# Patient Record
Sex: FEMALE | Race: WHITE | Employment: UNEMPLOYED | URBAN - METROPOLITAN AREA
[De-identification: names, ages, dates, MRNs, and addresses within clinical notes are randomized per-mention and may not be internally consistent; named-entity substitution may affect disease eponyms.]

---

## 2018-01-05 PROCEDURE — 87624 HPV HI-RISK TYP POOLED RSLT: CPT | Performed by: OBSTETRICS & GYNECOLOGY

## 2018-01-05 PROCEDURE — 88175 CYTOPATH C/V AUTO FLUID REDO: CPT | Performed by: OBSTETRICS & GYNECOLOGY

## 2018-01-22 PROCEDURE — 80061 LIPID PANEL: CPT | Performed by: FAMILY MEDICINE

## 2018-04-17 PROCEDURE — 86160 COMPLEMENT ANTIGEN: CPT | Performed by: INTERNAL MEDICINE

## 2018-04-23 PROBLEM — M25.532 WRIST PAIN, LEFT: Status: ACTIVE | Noted: 2018-04-23

## 2018-04-23 PROBLEM — M25.531 WRIST PAIN, RIGHT: Status: ACTIVE | Noted: 2018-04-23

## 2018-06-21 PROBLEM — M25.532 WRIST PAIN, LEFT: Status: RESOLVED | Noted: 2018-04-23 | Resolved: 2018-06-21

## 2018-06-21 PROBLEM — M25.531 WRIST PAIN, RIGHT: Status: RESOLVED | Noted: 2018-04-23 | Resolved: 2018-06-21

## 2018-06-21 PROCEDURE — 87147 CULTURE TYPE IMMUNOLOGIC: CPT | Performed by: FAMILY MEDICINE

## 2018-06-21 PROCEDURE — 87086 URINE CULTURE/COLONY COUNT: CPT | Performed by: FAMILY MEDICINE

## 2018-06-25 PROBLEM — M25.531 RIGHT WRIST PAIN: Status: ACTIVE | Noted: 2018-04-23

## 2018-06-25 PROBLEM — M25.532 LEFT WRIST PAIN: Status: ACTIVE | Noted: 2018-04-23

## 2018-07-18 PROCEDURE — 81001 URINALYSIS AUTO W/SCOPE: CPT | Performed by: FAMILY MEDICINE

## 2018-07-18 PROCEDURE — 87186 SC STD MICRODIL/AGAR DIL: CPT | Performed by: FAMILY MEDICINE

## 2018-07-18 PROCEDURE — 87077 CULTURE AEROBIC IDENTIFY: CPT | Performed by: FAMILY MEDICINE

## 2018-07-18 PROCEDURE — 87086 URINE CULTURE/COLONY COUNT: CPT | Performed by: FAMILY MEDICINE

## 2018-08-03 PROCEDURE — 81001 URINALYSIS AUTO W/SCOPE: CPT | Performed by: FAMILY MEDICINE

## 2018-08-14 PROCEDURE — 81003 URINALYSIS AUTO W/O SCOPE: CPT | Performed by: UROLOGY

## 2018-08-30 PROCEDURE — 82507 ASSAY OF CITRATE: CPT | Performed by: UROLOGY

## 2018-08-30 PROCEDURE — 83945 ASSAY OF OXALATE: CPT | Performed by: UROLOGY

## 2018-08-30 PROCEDURE — 84392 ASSAY OF URINE SULFATE: CPT | Performed by: UROLOGY

## 2018-08-30 PROCEDURE — 82436 ASSAY OF URINE CHLORIDE: CPT | Performed by: UROLOGY

## 2018-08-30 PROCEDURE — 82340 ASSAY OF CALCIUM IN URINE: CPT | Performed by: UROLOGY

## 2019-01-29 ENCOUNTER — HOSPITAL ENCOUNTER (EMERGENCY)
Facility: HOSPITAL | Age: 44
Discharge: HOME | End: 2019-01-30
Attending: EMERGENCY MEDICINE
Payer: COMMERCIAL

## 2019-01-29 VITALS
RESPIRATION RATE: 14 BRPM | BODY MASS INDEX: 22 KG/M2 | HEART RATE: 72 BPM | OXYGEN SATURATION: 100 % | WEIGHT: 123.44 LBS | TEMPERATURE: 98 F | DIASTOLIC BLOOD PRESSURE: 76 MMHG | SYSTOLIC BLOOD PRESSURE: 113 MMHG

## 2019-01-29 DIAGNOSIS — R51.9 ACUTE NONINTRACTABLE HEADACHE, UNSPECIFIED HEADACHE TYPE: Primary | ICD-10-CM

## 2019-01-29 LAB
ANION GAP SERPL CALC-SCNC: 8 MMOL/L (ref 0–18)
BUN BLD-MCNC: 13 MG/DL (ref 8–20)
BUN/CREAT SERPL: 14 (ref 10–20)
CALCIUM BLD-MCNC: 9.3 MG/DL (ref 8.3–10.3)
CHLORIDE SERPL-SCNC: 107 MMOL/L (ref 101–111)
CO2 SERPL-SCNC: 26 MMOL/L (ref 22–32)
CREAT BLD-MCNC: 0.93 MG/DL (ref 0.55–1.02)
GLUCOSE BLD-MCNC: 93 MG/DL (ref 70–99)
OSMOLALITY SERPL CALC.SUM OF ELEC: 292 MOSM/KG (ref 275–295)
POTASSIUM SERPL-SCNC: 3.4 MMOL/L (ref 3.6–5.1)
SODIUM SERPL-SCNC: 141 MMOL/L (ref 136–144)

## 2019-01-29 PROCEDURE — 99284 EMERGENCY DEPT VISIT MOD MDM: CPT

## 2019-01-29 PROCEDURE — 96374 THER/PROPH/DIAG INJ IV PUSH: CPT

## 2019-01-29 PROCEDURE — 96375 TX/PRO/DX INJ NEW DRUG ADDON: CPT

## 2019-01-29 PROCEDURE — 80048 BASIC METABOLIC PNL TOTAL CA: CPT | Performed by: EMERGENCY MEDICINE

## 2019-01-29 RX ORDER — METOCLOPRAMIDE HYDROCHLORIDE 5 MG/ML
10 INJECTION INTRAMUSCULAR; INTRAVENOUS ONCE
Status: COMPLETED | OUTPATIENT
Start: 2019-01-29 | End: 2019-01-29

## 2019-01-29 RX ORDER — DIPHENHYDRAMINE HYDROCHLORIDE 50 MG/ML
25 INJECTION INTRAMUSCULAR; INTRAVENOUS ONCE
Status: COMPLETED | OUTPATIENT
Start: 2019-01-29 | End: 2019-01-29

## 2019-01-29 RX ORDER — KETOROLAC TROMETHAMINE 30 MG/ML
30 INJECTION, SOLUTION INTRAMUSCULAR; INTRAVENOUS ONCE
Status: COMPLETED | OUTPATIENT
Start: 2019-01-29 | End: 2019-01-29

## 2019-01-30 NOTE — ED PROVIDER NOTES
Patient Seen in: [facility] Emergency Department    History   Patient presents with:  Headache (neurologic)    Stated Complaint: migraine headache     HPI    53-year-old female with a history of depression, IBS, history of kidney stones presents to Smoker      Smokeless tobacco: Never Used    Alcohol use: Yes      Alcohol/week: 0.0 oz      Comment: seldom    Drug use: No      Review of Systems    Positive for stated complaint: migraine headache   Other systems are as noted in HPI.   Constitutional and established and labs were drawn. The patient was administered IV Toradol, Benadryl, Reglan medications for the purposes of treating  [acute headache]. The patient had good response to these medications.     Patient continued to be observed here in the e

## 2019-04-30 PROBLEM — G43.001 MIGRAINE WITHOUT AURA WITH STATUS MIGRAINOSUS: Status: ACTIVE | Noted: 2019-04-30

## 2019-04-30 NOTE — PROGRESS NOTES
Pt states she feels pressure through out the forehead, top of head and back of head. Pt states pain is fleeting. Pt states that she experiences dizziness, confusion, forgetfulness, and blurred vision with headaches.  Pt states that she also experiences weak

## 2019-04-30 NOTE — PATIENT INSTRUCTIONS
Refill policies:    • Allow 2-3 business days for refills; controlled substances may take longer.   • Contact your pharmacy at least 5 days prior to running out of medication and have them send an electronic request or submit request through the “request re been approved by your insurer. Depending on your insurance carrier, approval may take 3-10 days. It is highly recommended patients contact their insurance carrier directly to determine coverage.   If test is done without insurance authorization, patient ma PRN  Taper off Neurontin 100 mg qhs for a week, then stop, stop flexeril, stop inderal

## 2019-04-30 NOTE — PROGRESS NOTES
[facility] [address]   Neurology; INITIAL CLINIC VISIT  2019, 9:28 AM     Delfino Mancilla Patient Status:  No patient class for patient encounter    1975 MRN [id number]   Location [facility] retrograde pyelogram-Dr. Patsy Wright   • REMOVAL OF KIDNEY STONE  2002    kidney stone removed   • SIGMOIDOSCOPY,DIAGNOSTIC  4/9/12    wnl (to hepatic flexure)   • WISDOM TEETH REMOVED  2001    wisdom teeth removal x 4       FAMILY HISTORY:  family history include SYSTEMS:    GENERAL HEALTH:  feels well, calm, normal appetite,   EYES: no visual complaints or deficits  HEENT: denies nasal congestion, sinus pain or sore throat; no  hearing loss;  RESPIRATORY: denies shortness of breath, wheezing or cough   CARDIOVASCU No nystagmus, no ptosis       CN V: Masseters strong, Facial sensations normal,        CN  VII:  Symmetric facial movement       CN VIII:  Normal hearing       CN IX, XI:  Normal Gag, uvula palate midline       CN XII:  SCM strong, equal shoulder controlled with inderal, neurontin and flexiril, those made her forgetful and tired.    Will Use elavil 10 mg qhs, Imitrex and Fioricet PRN  Taper off Neurontin 100 mg qhs for a week, then stop,   stop flexeril,   stop inderal  Increase exercise,       Retu Anesthesia Volume In Cc: 6

## 2019-05-01 ENCOUNTER — TELEPHONE (OUTPATIENT)
Dept: NEUROLOGY | Facility: CLINIC | Age: 44
End: 2019-05-01

## 2019-06-23 ENCOUNTER — APPOINTMENT (OUTPATIENT)
Dept: GENERAL RADIOLOGY | Facility: HOSPITAL | Age: 44
End: 2019-06-23
Attending: EMERGENCY MEDICINE
Payer: COMMERCIAL

## 2019-06-23 ENCOUNTER — HOSPITAL ENCOUNTER (EMERGENCY)
Facility: HOSPITAL | Age: 44
Discharge: HOME | End: 2019-06-23
Attending: EMERGENCY MEDICINE
Payer: COMMERCIAL

## 2019-06-23 VITALS
OXYGEN SATURATION: 100 % | DIASTOLIC BLOOD PRESSURE: 72 MMHG | WEIGHT: 135 LBS | RESPIRATION RATE: 24 BRPM | BODY MASS INDEX: 23.92 KG/M2 | SYSTOLIC BLOOD PRESSURE: 105 MMHG | TEMPERATURE: 98 F | HEART RATE: 70 BPM | HEIGHT: 63 IN

## 2019-06-23 DIAGNOSIS — J20.9 ACUTE BRONCHITIS, UNSPECIFIED ORGANISM: Primary | ICD-10-CM

## 2019-06-23 PROCEDURE — 85730 THROMBOPLASTIN TIME PARTIAL: CPT | Performed by: EMERGENCY MEDICINE

## 2019-06-23 PROCEDURE — 80053 COMPREHEN METABOLIC PANEL: CPT | Performed by: EMERGENCY MEDICINE

## 2019-06-23 PROCEDURE — 93005 ELECTROCARDIOGRAM TRACING: CPT

## 2019-06-23 PROCEDURE — 83880 ASSAY OF NATRIURETIC PEPTIDE: CPT | Performed by: EMERGENCY MEDICINE

## 2019-06-23 PROCEDURE — 85025 COMPLETE CBC W/AUTO DIFF WBC: CPT | Performed by: EMERGENCY MEDICINE

## 2019-06-23 PROCEDURE — 85610 PROTHROMBIN TIME: CPT | Performed by: EMERGENCY MEDICINE

## 2019-06-23 PROCEDURE — 99284 EMERGENCY DEPT VISIT MOD MDM: CPT

## 2019-06-23 PROCEDURE — 71046 X-RAY EXAM CHEST 2 VIEWS: CPT | Performed by: EMERGENCY MEDICINE

## 2019-06-23 PROCEDURE — 99285 EMERGENCY DEPT VISIT HI MDM: CPT

## 2019-06-23 PROCEDURE — 84484 ASSAY OF TROPONIN QUANT: CPT | Performed by: EMERGENCY MEDICINE

## 2019-06-23 PROCEDURE — 96374 THER/PROPH/DIAG INJ IV PUSH: CPT

## 2019-06-23 PROCEDURE — 93010 ELECTROCARDIOGRAM REPORT: CPT

## 2019-06-23 PROCEDURE — 85378 FIBRIN DEGRADE SEMIQUANT: CPT | Performed by: EMERGENCY MEDICINE

## 2019-06-23 RX ORDER — KETOROLAC TROMETHAMINE 30 MG/ML
30 INJECTION, SOLUTION INTRAMUSCULAR; INTRAVENOUS ONCE
Status: COMPLETED | OUTPATIENT
Start: 2019-06-23 | End: 2019-06-23

## 2019-06-23 RX ORDER — LIDOCAINE HYDROCHLORIDE 20 MG/ML
10 SOLUTION OROPHARYNGEAL ONCE
Status: COMPLETED | OUTPATIENT
Start: 2019-06-23 | End: 2019-06-23

## 2019-06-23 RX ORDER — MAGNESIUM HYDROXIDE/ALUMINUM HYDROXICE/SIMETHICONE 120; 1200; 1200 MG/30ML; MG/30ML; MG/30ML
30 SUSPENSION ORAL ONCE
Status: COMPLETED | OUTPATIENT
Start: 2019-06-23 | End: 2019-06-23

## 2019-06-23 RX ORDER — BENZONATATE 100 MG/1
100 CAPSULE ORAL 3 TIMES DAILY PRN
Qty: 30 CAPSULE | Refills: 0 | Status: SHIPPED | OUTPATIENT
Start: 2019-06-23 | End: 2019-07-12 | Stop reason: ALTCHOICE

## 2019-06-23 RX ORDER — ALBUTEROL SULFATE 2.5 MG/3ML
SOLUTION RESPIRATORY (INHALATION) EVERY 6 HOURS PRN
COMMUNITY
End: 2019-07-12 | Stop reason: ALTCHOICE

## 2019-06-23 NOTE — ED INITIAL ASSESSMENT (HPI)
Pt presents to ER with cough, SOB, and chest pressure. Symptoms have been present for 3 weeks. Pt has seen her primary Md and been to urgent cares. Pt has taken antibiotics, steroids, inhaler, and breathing txs with no relief. Pt is speaking clearly.  Skin

## 2019-09-19 PROCEDURE — 87086 URINE CULTURE/COLONY COUNT: CPT | Performed by: FAMILY MEDICINE

## 2019-09-28 ENCOUNTER — APPOINTMENT (OUTPATIENT)
Dept: ULTRASOUND IMAGING | Facility: HOSPITAL | Age: 44
End: 2019-09-28
Attending: EMERGENCY MEDICINE
Payer: COMMERCIAL

## 2019-09-28 ENCOUNTER — HOSPITAL ENCOUNTER (EMERGENCY)
Facility: HOSPITAL | Age: 44
Discharge: HOME | End: 2019-09-28
Attending: EMERGENCY MEDICINE
Payer: COMMERCIAL

## 2019-09-28 VITALS
WEIGHT: 144 LBS | HEART RATE: 65 BPM | HEIGHT: 63 IN | SYSTOLIC BLOOD PRESSURE: 107 MMHG | RESPIRATION RATE: 12 BRPM | TEMPERATURE: 97 F | OXYGEN SATURATION: 100 % | DIASTOLIC BLOOD PRESSURE: 73 MMHG | BODY MASS INDEX: 25.52 KG/M2

## 2019-09-28 DIAGNOSIS — N83.202 CYST OF LEFT OVARY: Primary | ICD-10-CM

## 2019-09-28 PROCEDURE — 96361 HYDRATE IV INFUSION ADD-ON: CPT

## 2019-09-28 PROCEDURE — 85025 COMPLETE CBC W/AUTO DIFF WBC: CPT | Performed by: EMERGENCY MEDICINE

## 2019-09-28 PROCEDURE — 80053 COMPREHEN METABOLIC PANEL: CPT | Performed by: EMERGENCY MEDICINE

## 2019-09-28 PROCEDURE — 96374 THER/PROPH/DIAG INJ IV PUSH: CPT

## 2019-09-28 PROCEDURE — 99285 EMERGENCY DEPT VISIT HI MDM: CPT

## 2019-09-28 PROCEDURE — 81025 URINE PREGNANCY TEST: CPT

## 2019-09-28 PROCEDURE — 76770 US EXAM ABDO BACK WALL COMP: CPT | Performed by: EMERGENCY MEDICINE

## 2019-09-28 PROCEDURE — 76856 US EXAM PELVIC COMPLETE: CPT | Performed by: EMERGENCY MEDICINE

## 2019-09-28 PROCEDURE — 93975 VASCULAR STUDY: CPT | Performed by: EMERGENCY MEDICINE

## 2019-09-28 PROCEDURE — 76830 TRANSVAGINAL US NON-OB: CPT | Performed by: EMERGENCY MEDICINE

## 2019-09-28 PROCEDURE — 99284 EMERGENCY DEPT VISIT MOD MDM: CPT

## 2019-09-28 RX ORDER — KETOROLAC TROMETHAMINE 30 MG/ML
15 INJECTION, SOLUTION INTRAMUSCULAR; INTRAVENOUS ONCE
Status: COMPLETED | OUTPATIENT
Start: 2019-09-28 | End: 2019-09-28

## 2019-09-28 NOTE — ED INITIAL ASSESSMENT (HPI)
Left lower Quad pain and pelvis pain for the past three weeks and today pain got worse and some difficulty urinating.

## 2019-09-28 NOTE — ED PROVIDER NOTES
Patient Seen in: [facility] Emergency Department      History   Patient presents with:  Abdomen/Flank Pain (GI/)    Stated Complaint: LLQ abdominal pain    HPI    51-year-old female complaining of left lower quadrant abdominal pain.   The patient's seldom    Drug use: No             Review of Systems    Positive for stated complaint: LLQ abdominal pain  Other systems are as noted in HPI. Constitutional and vital signs reviewed. All other systems reviewed and negative except as noted above.     P LAVENDER   RAINBOW DRAW LIGHT GREEN   RAINBOW DRAW GOLD   CBC W/ DIFFERENTIAL          Ct Abdomen+pelvis(cpt=74176)    Result Date: 9/17/2019  IMPRESSION: 1. No radiographic evidence of obstructive uropathy or other acute abdominopelvic processes.  2. Right

## 2019-10-01 PROBLEM — N20.0 KIDNEY STONES: Chronic | Status: ACTIVE | Noted: 2019-10-01

## 2019-10-11 PROCEDURE — 81015 MICROSCOPIC EXAM OF URINE: CPT | Performed by: PHYSICIAN ASSISTANT

## 2019-10-28 ENCOUNTER — OFFICE VISIT (OUTPATIENT)
Dept: SURGERY | Facility: CLINIC | Age: 44
End: 2019-10-28
Payer: COMMERCIAL

## 2019-10-28 ENCOUNTER — TELEPHONE (OUTPATIENT)
Dept: SURGERY | Facility: CLINIC | Age: 44
End: 2019-10-28

## 2019-10-28 ENCOUNTER — HOSPITAL ENCOUNTER (OUTPATIENT)
Dept: GENERAL RADIOLOGY | Facility: HOSPITAL | Age: 44
Discharge: HOME | End: 2019-10-28
Attending: UROLOGY
Payer: COMMERCIAL

## 2019-10-28 VITALS — HEART RATE: 79 BPM | SYSTOLIC BLOOD PRESSURE: 92 MMHG | TEMPERATURE: 98 F | DIASTOLIC BLOOD PRESSURE: 66 MMHG

## 2019-10-28 DIAGNOSIS — R10.9 BILATERAL FLANK PAIN: Primary | ICD-10-CM

## 2019-10-28 DIAGNOSIS — R82.90 URINE FINDING: Primary | ICD-10-CM

## 2019-10-28 DIAGNOSIS — R10.9 BILATERAL FLANK PAIN: ICD-10-CM

## 2019-10-28 DIAGNOSIS — N20.0 KIDNEY STONES: ICD-10-CM

## 2019-10-28 PROCEDURE — 81003 URINALYSIS AUTO W/O SCOPE: CPT | Performed by: UROLOGY

## 2019-10-28 PROCEDURE — 99243 OFF/OP CNSLTJ NEW/EST LOW 30: CPT | Performed by: UROLOGY

## 2019-10-28 PROCEDURE — 74018 RADEX ABDOMEN 1 VIEW: CPT | Performed by: UROLOGY

## 2019-10-28 RX ORDER — CETIRIZINE HYDROCHLORIDE 10 MG/1
10 TABLET ORAL DAILY
COMMUNITY
End: 2021-07-22 | Stop reason: ALTCHOICE

## 2019-10-28 NOTE — TELEPHONE ENCOUNTER
Per Dr. Kenyatta Bertrand surgery has been changed to 10/30/19. Patient notified of change by Cameron Luna. Patient agreeable to change.

## 2019-10-28 NOTE — PROGRESS NOTES
Rooming Clinician:     Juanmustapha Desouza is a 41year old female. Patient presents with:  Flank Pain: bilateral flank pain and now pelvic pain x 7 weeks, 6/10 pain scale.  H/O kidney stone, gross hematuria  Kidney Stones / Ureteral Stone  Pain: Yes bilateral Take 10 mg by mouth daily. , Disp: , Rfl:   ergocalciferol 20125 units Oral Cap, Take 1 capsule (50,000 Units total) by mouth once a week., Disp: 16 capsule, Rfl: 0  Multiple Vitamins-Minerals (ZINC OR), Take by mouth., Disp: , Rfl:   Eletriptan Hydrobromid • Tennis elbow      Past Surgical History:   Procedure Laterality Date   • COLONOSCOPY  ~2005    Normal per pt   • ELBOW SURGERY Right    • ESWL LITHOTRIPSY WITH CYSTOSCOPY, STENT PLACEMENT Left 5/17/2010    Performed by Jhon Romero MD at [address] [place] [address] 8.3 09/28/2019    HGB 13.6 09/28/2019    HCT 40.8 09/28/2019    .0 09/28/2019    CREATSERUM 0.94 09/28/2019    BUN 13 09/28/2019     09/28/2019    K 3.4 (L) 09/28/2019     09/28/2019    CO2 27.0 09/28/2019    GLU 76 09/28/2019    CA 9.4

## 2019-10-28 NOTE — H&P
Patient is a history of left flank pain which is intermittent over approximately 6 weeks duration. Pain is moderately severe requiring pain medication. Patient is allergic to Tylenol.   Is also had some right flank pain last week with an episode of gr prior to the examination as well., Disp: 15 tablet, Rfl: 0  chlordiazepoxide-clidinium 5-2.5 MG Oral Cap, Take 1 capsule by mouth 4 (four) times daily before meals and nightly., Disp: 90 capsule, Rfl: 1  Cyclobenzaprine HCl 5 MG Oral Tab, Take 1 tablet (5 and denies heartburn  : see HPI  NEURO: no sensory or motor complaint     EXAM:      BP 92/66 (BP Location: Left arm, Patient Position: Sitting, Cuff Size: adult)   Pulse 79   Temp 97.6 °F (36.4 °C) (Oral)   GENERAL: well developed, well nourished,in no abdomen/pelvis, 9/17/2019  FINDINGS:  COLLECTING SYSTEM: The kidneys are symmetric in size without hydronephrosis. There are  nonobstructing right renal calculi measuring 4 x 2 mm (22/3) and 1 mm (30), respectively.  There are  tiny 1 mm nonobstructing left    KUB today     Schedule cystoscopy, bilateral retrograde pyelograms possible ureteroscopy and/or laser lithotripsy and possible stent insertion under general anesthesia as soon as possible        I discussed ureteroscopy with the patient and explained

## 2019-10-28 NOTE — PROGRESS NOTES
Your recent KUB is normal.  No stone is visible. Stool in the right and transverse colon. Recommend follow up in the office as directed.     Sincerely,  Domingo Foss MD

## 2019-10-28 NOTE — TELEPHONE ENCOUNTER
Cystoscopy, bilateral retrograde pyelograms possible ureteroscopy and/or laser lithotripsy and possible stent insertion scheduled at [facility] outpatient on 10/29/19. Per Pearl Flores, patient has been notified of date.   Pre op instructions wer

## 2019-10-29 ENCOUNTER — TELEPHONE (OUTPATIENT)
Dept: SURGERY | Facility: CLINIC | Age: 44
End: 2019-10-29

## 2019-10-29 NOTE — TELEPHONE ENCOUNTER
Per the automated system for [employer], there is no prior authorization needed for upcoming outpatient surgery. CPT codes 95953 and 73450. Reference #[id number].

## 2019-10-30 ENCOUNTER — HOSPITAL ENCOUNTER (OUTPATIENT)
Facility: HOSPITAL | Age: 44
Setting detail: HOSPITAL OUTPATIENT SURGERY
Discharge: HOME | End: 2019-10-30
Attending: UROLOGY | Admitting: UROLOGY
Payer: COMMERCIAL

## 2019-10-30 ENCOUNTER — APPOINTMENT (OUTPATIENT)
Dept: GENERAL RADIOLOGY | Facility: HOSPITAL | Age: 44
End: 2019-10-30
Attending: UROLOGY
Payer: COMMERCIAL

## 2019-10-30 ENCOUNTER — ANESTHESIA EVENT (OUTPATIENT)
Dept: SURGERY | Facility: HOSPITAL | Age: 44
End: 2019-10-30
Payer: COMMERCIAL

## 2019-10-30 ENCOUNTER — TELEPHONE (OUTPATIENT)
Dept: SURGERY | Facility: CLINIC | Age: 44
End: 2019-10-30

## 2019-10-30 ENCOUNTER — ANESTHESIA (OUTPATIENT)
Dept: SURGERY | Facility: HOSPITAL | Age: 44
End: 2019-10-30
Payer: COMMERCIAL

## 2019-10-30 VITALS
HEIGHT: 63 IN | DIASTOLIC BLOOD PRESSURE: 78 MMHG | TEMPERATURE: 98 F | OXYGEN SATURATION: 100 % | HEART RATE: 78 BPM | WEIGHT: 146.38 LBS | BODY MASS INDEX: 25.94 KG/M2 | RESPIRATION RATE: 18 BRPM | SYSTOLIC BLOOD PRESSURE: 109 MMHG

## 2019-10-30 DIAGNOSIS — R10.9 BILATERAL FLANK PAIN: ICD-10-CM

## 2019-10-30 PROCEDURE — 0T9880Z DRAINAGE OF BILATERAL URETERS WITH DRAINAGE DEVICE, VIA NATURAL OR ARTIFICIAL OPENING ENDOSCOPIC: ICD-10-PCS | Performed by: UROLOGY

## 2019-10-30 PROCEDURE — 0T778DZ DILATION OF LEFT URETER WITH INTRALUMINAL DEVICE, VIA NATURAL OR ARTIFICIAL OPENING ENDOSCOPIC: ICD-10-PCS | Performed by: UROLOGY

## 2019-10-30 PROCEDURE — 52332 CYSTOSCOPY AND TREATMENT: CPT | Performed by: UROLOGY

## 2019-10-30 PROCEDURE — 52351 CYSTOURETERO & OR PYELOSCOPE: CPT | Performed by: UROLOGY

## 2019-10-30 PROCEDURE — BT141ZZ FLUOROSCOPY OF KIDNEYS, URETERS AND BLADDER USING LOW OSMOLAR CONTRAST: ICD-10-PCS | Performed by: UROLOGY

## 2019-10-30 DEVICE — STENT URET 6F 24CM ULSMTH: Type: IMPLANTABLE DEVICE | Status: FUNCTIONAL

## 2019-10-30 RX ORDER — SODIUM CHLORIDE, SODIUM LACTATE, POTASSIUM CHLORIDE, CALCIUM CHLORIDE 600; 310; 30; 20 MG/100ML; MG/100ML; MG/100ML; MG/100ML
INJECTION, SOLUTION INTRAVENOUS CONTINUOUS
Status: DISCONTINUED | OUTPATIENT
Start: 2019-10-30 | End: 2019-10-30

## 2019-10-30 RX ORDER — NALOXONE HYDROCHLORIDE 0.4 MG/ML
80 INJECTION, SOLUTION INTRAMUSCULAR; INTRAVENOUS; SUBCUTANEOUS AS NEEDED
Status: DISCONTINUED | OUTPATIENT
Start: 2019-10-30 | End: 2019-10-30

## 2019-10-30 RX ORDER — DEXAMETHASONE SODIUM PHOSPHATE 4 MG/ML
VIAL (ML) INJECTION AS NEEDED
Status: DISCONTINUED | OUTPATIENT
Start: 2019-10-30 | End: 2019-10-30 | Stop reason: SURG

## 2019-10-30 RX ORDER — CLINDAMYCIN PHOSPHATE 900 MG/50ML
900 INJECTION INTRAVENOUS ONCE
Status: COMPLETED | OUTPATIENT
Start: 2019-10-30 | End: 2019-10-30

## 2019-10-30 RX ORDER — ONDANSETRON 2 MG/ML
INJECTION INTRAMUSCULAR; INTRAVENOUS
Status: DISCONTINUED
Start: 2019-10-30 | End: 2019-10-30

## 2019-10-30 RX ORDER — KETOROLAC TROMETHAMINE 30 MG/ML
INJECTION, SOLUTION INTRAMUSCULAR; INTRAVENOUS AS NEEDED
Status: DISCONTINUED | OUTPATIENT
Start: 2019-10-30 | End: 2019-10-30 | Stop reason: SURG

## 2019-10-30 RX ORDER — ONDANSETRON 2 MG/ML
4 INJECTION INTRAMUSCULAR; INTRAVENOUS EVERY 6 HOURS PRN
Status: DISCONTINUED | OUTPATIENT
Start: 2019-10-30 | End: 2019-10-30

## 2019-10-30 RX ORDER — ONDANSETRON 2 MG/ML
4 INJECTION INTRAMUSCULAR; INTRAVENOUS ONCE
Status: COMPLETED | OUTPATIENT
Start: 2019-10-30 | End: 2019-10-30

## 2019-10-30 RX ORDER — HYDROMORPHONE HYDROCHLORIDE 1 MG/ML
0.4 INJECTION, SOLUTION INTRAMUSCULAR; INTRAVENOUS; SUBCUTANEOUS EVERY 5 MIN PRN
Status: DISCONTINUED | OUTPATIENT
Start: 2019-10-30 | End: 2019-10-30

## 2019-10-30 RX ORDER — LIDOCAINE HYDROCHLORIDE 10 MG/ML
INJECTION, SOLUTION EPIDURAL; INFILTRATION; INTRACAUDAL; PERINEURAL AS NEEDED
Status: DISCONTINUED | OUTPATIENT
Start: 2019-10-30 | End: 2019-10-30 | Stop reason: SURG

## 2019-10-30 RX ORDER — ONDANSETRON 2 MG/ML
INJECTION INTRAMUSCULAR; INTRAVENOUS AS NEEDED
Status: DISCONTINUED | OUTPATIENT
Start: 2019-10-30 | End: 2019-10-30 | Stop reason: SURG

## 2019-10-30 RX ADMIN — ONDANSETRON 4 MG: 2 INJECTION INTRAMUSCULAR; INTRAVENOUS at 16:07:00

## 2019-10-30 RX ADMIN — KETOROLAC TROMETHAMINE 30 MG: 30 INJECTION, SOLUTION INTRAMUSCULAR; INTRAVENOUS at 16:07:00

## 2019-10-30 RX ADMIN — DEXAMETHASONE SODIUM PHOSPHATE 4 MG: 4 MG/ML VIAL (ML) INJECTION at 15:38:00

## 2019-10-30 RX ADMIN — CLINDAMYCIN PHOSPHATE 900 MG: 900 INJECTION INTRAVENOUS at 15:30:00

## 2019-10-30 RX ADMIN — LIDOCAINE HYDROCHLORIDE 50 MG: 10 INJECTION, SOLUTION EPIDURAL; INFILTRATION; INTRACAUDAL; PERINEURAL at 15:24:00

## 2019-10-30 NOTE — ANESTHESIA PROCEDURE NOTES
Airway  Date/Time: 10/30/2019 3:27 PM  Urgency: elective    Airway not difficult    General Information and Staff    Patient location during procedure: OR  Anesthesiologist: Eyad Morales MD  Performed: anesthesiologist     Indications and Patient Conditi

## 2019-10-30 NOTE — INTERVAL H&P NOTE
Pre-op Diagnosis: Bilateral flank pain [R10.9]    The above referenced H&P was reviewed by Valerie Barton MD on 10/30/2019, the patient was examined and no significant changes have occurred in the patient's condition since the H&P was performed.   I disc

## 2019-10-30 NOTE — ANESTHESIA POSTPROCEDURE EVALUATION
[facility]    Kortney Head Patient Status:  Hospital Outpatient Surgery   Age/Gender 41year old female MRN [id number]   [facility] SURGERY Attending Antonio Spears MD   Hosp Day # 0 PCP Ganesh Winston MD       Anesthesia Post-op Not

## 2019-10-30 NOTE — ANESTHESIA PREPROCEDURE EVALUATION
PRE-OP EVALUATION    Patient Name: Yung Koo    Pre-op Diagnosis: Bilateral flank pain [R10.9]    Procedure(s):  cystoscopy, bilateral retrograde pyelograms possible bilateral ureteroscopy and/or laser lithotripsy and possible bilateral ureteral stent Cardiovascular    Negative cardiovascular ROS. Endo/Other    Negative endo/other ROS. Pulmonary    Negative pulmonary ROS.                        Neuro/Psych  09/28/2019     09/23/2019    CO2 27.0 09/28/2019    CO2 26.5 09/23/2019    BUN 13 09/28/2019    BUN 12.0 09/23/2019    CREATSERUM 0.94 09/28/2019    CREATSERUM 0.90 09/23/2019    GLU 76 09/28/2019    GLU 85 09/23/2019    CA 9.4 09/28/2019    C

## 2019-10-30 NOTE — OPERATIVE REPORT
Operative Note    Patient Name: Alda Obrien    Date of Procedure: 10/30/2019    Preoperative Diagnosis: Bilateral flank pain [R10.9]    Postoperative Diagnosis: Bilateral flank pain [R10.9]    Primary Surgeon: Florence Marquez. Hany Cope M.D.      Procedure Perfor panendoscopy was performed showing no evidence of any tumors or stones. The musculature was smooth. The ureteral orifices were visualized on a normal trigone with clear reflux bilaterally.   Intraoperative fluoroscopy demonstrated no evidence of radio-opa

## 2019-10-30 NOTE — TELEPHONE ENCOUNTER
Call patient and have her come into the office on Friday afternoon for nurse visit to remove dangle stent. Will speak with her at that time as well.

## 2019-10-31 ENCOUNTER — APPOINTMENT (OUTPATIENT)
Dept: CT IMAGING | Facility: HOSPITAL | Age: 44
End: 2019-10-31
Attending: EMERGENCY MEDICINE
Payer: COMMERCIAL

## 2019-10-31 ENCOUNTER — TELEPHONE (OUTPATIENT)
Dept: SURGERY | Facility: CLINIC | Age: 44
End: 2019-10-31

## 2019-10-31 ENCOUNTER — HOSPITAL ENCOUNTER (EMERGENCY)
Facility: HOSPITAL | Age: 44
Discharge: HOME | End: 2019-10-31
Attending: EMERGENCY MEDICINE
Payer: COMMERCIAL

## 2019-10-31 VITALS
WEIGHT: 145 LBS | OXYGEN SATURATION: 97 % | BODY MASS INDEX: 26 KG/M2 | TEMPERATURE: 98 F | RESPIRATION RATE: 15 BRPM | SYSTOLIC BLOOD PRESSURE: 99 MMHG | HEART RATE: 62 BPM | DIASTOLIC BLOOD PRESSURE: 66 MMHG

## 2019-10-31 DIAGNOSIS — R10.9 FLANK PAIN: Primary | ICD-10-CM

## 2019-10-31 DIAGNOSIS — R31.9 URINARY TRACT INFECTION WITH HEMATURIA, SITE UNSPECIFIED: ICD-10-CM

## 2019-10-31 DIAGNOSIS — N39.0 URINARY TRACT INFECTION WITH HEMATURIA, SITE UNSPECIFIED: ICD-10-CM

## 2019-10-31 PROCEDURE — 99285 EMERGENCY DEPT VISIT HI MDM: CPT

## 2019-10-31 PROCEDURE — 83690 ASSAY OF LIPASE: CPT | Performed by: EMERGENCY MEDICINE

## 2019-10-31 PROCEDURE — 87086 URINE CULTURE/COLONY COUNT: CPT | Performed by: EMERGENCY MEDICINE

## 2019-10-31 PROCEDURE — 84702 CHORIONIC GONADOTROPIN TEST: CPT | Performed by: EMERGENCY MEDICINE

## 2019-10-31 PROCEDURE — 85025 COMPLETE CBC W/AUTO DIFF WBC: CPT

## 2019-10-31 PROCEDURE — 74176 CT ABD & PELVIS W/O CONTRAST: CPT | Performed by: EMERGENCY MEDICINE

## 2019-10-31 PROCEDURE — 96375 TX/PRO/DX INJ NEW DRUG ADDON: CPT

## 2019-10-31 PROCEDURE — 96374 THER/PROPH/DIAG INJ IV PUSH: CPT

## 2019-10-31 PROCEDURE — 80053 COMPREHEN METABOLIC PANEL: CPT | Performed by: EMERGENCY MEDICINE

## 2019-10-31 PROCEDURE — 81025 URINE PREGNANCY TEST: CPT

## 2019-10-31 PROCEDURE — 80053 COMPREHEN METABOLIC PANEL: CPT

## 2019-10-31 PROCEDURE — 81001 URINALYSIS AUTO W/SCOPE: CPT | Performed by: EMERGENCY MEDICINE

## 2019-10-31 PROCEDURE — 85025 COMPLETE CBC W/AUTO DIFF WBC: CPT | Performed by: EMERGENCY MEDICINE

## 2019-10-31 PROCEDURE — 99284 EMERGENCY DEPT VISIT MOD MDM: CPT

## 2019-10-31 PROCEDURE — 96361 HYDRATE IV INFUSION ADD-ON: CPT

## 2019-10-31 RX ORDER — IBUPROFEN 600 MG/1
600 TABLET ORAL EVERY 8 HOURS PRN
Qty: 30 TABLET | Refills: 0 | Status: SHIPPED | OUTPATIENT
Start: 2019-10-31 | End: 2019-10-31 | Stop reason: CLARIF

## 2019-10-31 RX ORDER — MORPHINE SULFATE 4 MG/ML
4 INJECTION, SOLUTION INTRAMUSCULAR; INTRAVENOUS EVERY 30 MIN PRN
Status: DISCONTINUED | OUTPATIENT
Start: 2019-10-31 | End: 2019-10-31

## 2019-10-31 RX ORDER — SULFAMETHOXAZOLE AND TRIMETHOPRIM 800; 160 MG/1; MG/1
1 TABLET ORAL ONCE
Status: COMPLETED | OUTPATIENT
Start: 2019-10-31 | End: 2019-10-31

## 2019-10-31 RX ORDER — SULFAMETHOXAZOLE AND TRIMETHOPRIM 800; 160 MG/1; MG/1
1 TABLET ORAL 2 TIMES DAILY
Qty: 14 TABLET | Refills: 0 | Status: SHIPPED | OUTPATIENT
Start: 2019-10-31 | End: 2019-11-07

## 2019-10-31 RX ORDER — ONDANSETRON 2 MG/ML
4 INJECTION INTRAMUSCULAR; INTRAVENOUS ONCE
Status: COMPLETED | OUTPATIENT
Start: 2019-10-31 | End: 2019-10-31

## 2019-10-31 RX ORDER — HYDROCODONE BITARTRATE AND IBUPROFEN 5; 200 MG/1; MG/1
1 TABLET ORAL EVERY 8 HOURS PRN
Qty: 15 TABLET | Refills: 0 | Status: SHIPPED | OUTPATIENT
Start: 2019-10-31 | End: 2019-11-05

## 2019-10-31 RX ORDER — KETOROLAC TROMETHAMINE 30 MG/ML
30 INJECTION, SOLUTION INTRAMUSCULAR; INTRAVENOUS ONCE
Status: COMPLETED | OUTPATIENT
Start: 2019-10-31 | End: 2019-10-31

## 2019-10-31 NOTE — TELEPHONE ENCOUNTER
Spouse is looking to  script for   HYDROcodone-Ibuprofen 1 tablet Oral Every 8 hours PRN     States pharm is out of this med

## 2019-10-31 NOTE — ED NOTES
Called CT and spoke to Kalyani Stanford, [address] at [phone] to ff-up Pt's turn for CT Scan. CT Tech stated \"In 5 mins. \" Pt updated regarding CT delay.

## 2019-10-31 NOTE — ED INITIAL ASSESSMENT (HPI)
43YF c/c of post op pain Pt state that she had an out pt cystoscopy and ureteroscopy done yesterday Pt state that tonight she having increased pain

## 2019-10-31 NOTE — ED NOTES
Pt states she was able to void after the procedure. Pt took aleve 30-45 mins PTA, nut vomited 1x thereafter.

## 2019-10-31 NOTE — ED PROVIDER NOTES
Patient Seen in: [facility] Emergency Department      History   Patient presents with:  Postop/Procedure Problem    Stated Complaint: post op pain control cystoscopy    HPI    Patient is a 51-year-old female status post cystoscopy, bilateral retrogr She is not moving much in the stretcher, appears that she is avoiding movement. Alert and oriented in no distress. Neuro: No focal neurologic deficits. No facial droop or slurred speech. CN II-XII intact.  Grossly normal and symmetric motor strength and Abnormal; Notable for the following components:    WBC 12.1 (*)     Neutrophil Absolute Prelim 11.16 (*)     Neutrophil Absolute 11.16 (*)     Lymphocyte Absolute 0.77 (*)     All other components within normal limits   HCG, BETA SUBUNIT (QUANT PREGNANCY T above.  She has been observed for couple hours, and is nontoxic in appearance. I discussed the patient with her urologist, Dr. Johana Sullivan who knows the patient well, who recommends outpatient management with antibiotics and follow-up.   Patient was given firs

## 2019-10-31 NOTE — TELEPHONE ENCOUNTER
Patient contacted. Relayed Dr. Kit Dey orders below to the patient. Offered patient appt tomorrow afternoon for dangle stent removal. Patient verbalized understanding.

## 2019-11-01 ENCOUNTER — OFFICE VISIT (OUTPATIENT)
Dept: SURGERY | Facility: CLINIC | Age: 44
End: 2019-11-01
Payer: COMMERCIAL

## 2019-11-01 VITALS — SYSTOLIC BLOOD PRESSURE: 109 MMHG | DIASTOLIC BLOOD PRESSURE: 70 MMHG | HEART RATE: 77 BPM | TEMPERATURE: 98 F

## 2019-11-01 DIAGNOSIS — N20.0 RENAL CALCULUS, RIGHT: Primary | ICD-10-CM

## 2019-11-01 DIAGNOSIS — R10.9 LEFT FLANK PAIN: ICD-10-CM

## 2019-11-01 PROCEDURE — 99024 POSTOP FOLLOW-UP VISIT: CPT | Performed by: UROLOGY

## 2019-11-01 RX ORDER — POLYETHYLENE GLYCOL 3350 17 G/17G
17 POWDER, FOR SOLUTION ORAL DAILY
COMMUNITY
End: 2020-10-15 | Stop reason: ALTCHOICE

## 2019-11-01 NOTE — PROGRESS NOTES
Rooming Clinician:     Lizzie Gandhi is a 41year old female. Patient presents with: Follow - Up: dangle stent removal  s/p cystoscopy 2 days ago . C/o llq pain at this time. 6 on pain scale. Last dose of Norco, 2 tabs at 07:30.  States it did relieve h MG/SPRAY Nasal Solution, 15.75 mg by Nasal route.  , Disp: , Rfl:   Cholecalciferol (VITAMIN D) 1000 units Oral Tab, Take 250 mg by mouth., Disp: , Rfl:   Eletriptan Hydrobromide 40 MG Oral Tab, 1 pill at migraine onset. Repeat in 2 hours if necessary.  Zelpha Eastern drinks      Frequency: Monthly or less      Drinks per session: 1 or 2      Binge frequency: Never      Comment: seldom    Drug use: No       REVIEW OF SYSTEMS:     GENERAL HEALTH: feels well otherwise  SKIN: denies any unusual skin lesions or rashes  RESP as right and left sided lateral abdominal pain for 7 weeks. She has a history of left sided kidney stones. FINDINGS:  BOWEL GAS PATTERN:  No obstruction. Moderate amount of stool in the right and transverse colon.   The remainder of the colon to rectum symmetric in size and shape with there is mild right-sided hydronephrosis. No obstructing urolithiasis. Nonobstructing right renal calculus. There is a double pigtail left-sided nephroureteral stent. BOWEL/MESENTERY:  Bowel is normal in caliber.  No evid 13:16            ASSESSMENT:     Asymptomatic/unchanged small right renal calculus  Chronic left flank pain  History of urolithiasis    PLAN:     [place] fluid hydration      Diagnoses and all orders for this visit:    Renal calculus, right    Left flank p

## 2019-11-01 NOTE — PATIENT INSTRUCTIONS
On behalf of myself and care team, I would like to thank you for entrusting us with your care today. It is our goal to provide you and your family with excellent care.   Please note that you may receive a survey in the mail; any feedback you have for this

## 2019-11-26 ENCOUNTER — OFFICE VISIT (OUTPATIENT)
Dept: SURGERY | Facility: CLINIC | Age: 44
End: 2019-11-26
Payer: COMMERCIAL

## 2019-11-26 VITALS
WEIGHT: 146 LBS | HEART RATE: 88 BPM | BODY MASS INDEX: 26 KG/M2 | SYSTOLIC BLOOD PRESSURE: 105 MMHG | DIASTOLIC BLOOD PRESSURE: 74 MMHG

## 2019-11-26 DIAGNOSIS — N20.0 RENAL CALCULUS, RIGHT: Primary | ICD-10-CM

## 2019-11-26 DIAGNOSIS — M54.6 BILATERAL THORACIC BACK PAIN, UNSPECIFIED CHRONICITY: ICD-10-CM

## 2019-11-26 PROCEDURE — 99211 OFF/OP EST MAY X REQ PHY/QHP: CPT | Performed by: UROLOGY

## 2019-11-26 NOTE — PROGRESS NOTES
Rooming Clinician:     Alda Obrien is a 41year old female. Patient presents with:   Follow - Up: patient presents for f/u on kidney stones is having enrique pain         HPI:     Patient returns to the office with history of bilateral mid back pain which is as a child   • Depression    • IBS    • IBS (irritable bowel syndrome)    • KIDNEY STONE 2002   • Migraine    • Migraines    • Raynaud's disease    • Tennis elbow      Past Surgical History:   Procedure Laterality Date   • COLONOSCOPY  ~2005    Normal per normal respiratory motion without distress  CARDIO: normal peripheral perfusion  NEURO: grossly normal  BACK: Point tenderness bilaterally over the area of the 11th and 12th rib.   Full range of motion  EXTREMITIES: no cyanosis, clubbing or edema    LAB:

## 2019-11-26 NOTE — PROGRESS NOTES
Kidney Stones / Ureteral Stone  Pain: yes  bilateral  Nausea: No    Vomiting: No    Previous Stones:yes    Chemical Analysis:   Medical Evaluation:    Recent Imaging ct 10/31/19    Previous Medications for Stones:

## 2020-04-18 ENCOUNTER — APPOINTMENT (OUTPATIENT)
Dept: CT IMAGING | Facility: HOSPITAL | Age: 45
End: 2020-04-18
Attending: EMERGENCY MEDICINE
Payer: COMMERCIAL

## 2020-04-18 ENCOUNTER — HOSPITAL ENCOUNTER (EMERGENCY)
Facility: HOSPITAL | Age: 45
Discharge: HOME | End: 2020-04-18
Attending: EMERGENCY MEDICINE
Payer: COMMERCIAL

## 2020-04-18 VITALS
OXYGEN SATURATION: 100 % | HEART RATE: 69 BPM | BODY MASS INDEX: 26.22 KG/M2 | RESPIRATION RATE: 17 BRPM | SYSTOLIC BLOOD PRESSURE: 115 MMHG | TEMPERATURE: 98 F | HEIGHT: 63 IN | DIASTOLIC BLOOD PRESSURE: 78 MMHG | WEIGHT: 148 LBS

## 2020-04-18 DIAGNOSIS — N20.0 KIDNEY STONE: Primary | ICD-10-CM

## 2020-04-18 PROCEDURE — 87086 URINE CULTURE/COLONY COUNT: CPT | Performed by: EMERGENCY MEDICINE

## 2020-04-18 PROCEDURE — 83690 ASSAY OF LIPASE: CPT | Performed by: EMERGENCY MEDICINE

## 2020-04-18 PROCEDURE — 74177 CT ABD & PELVIS W/CONTRAST: CPT | Performed by: EMERGENCY MEDICINE

## 2020-04-18 PROCEDURE — 96376 TX/PRO/DX INJ SAME DRUG ADON: CPT

## 2020-04-18 PROCEDURE — 99284 EMERGENCY DEPT VISIT MOD MDM: CPT

## 2020-04-18 PROCEDURE — 96374 THER/PROPH/DIAG INJ IV PUSH: CPT

## 2020-04-18 PROCEDURE — 81025 URINE PREGNANCY TEST: CPT

## 2020-04-18 PROCEDURE — 96375 TX/PRO/DX INJ NEW DRUG ADDON: CPT

## 2020-04-18 PROCEDURE — 85025 COMPLETE CBC W/AUTO DIFF WBC: CPT | Performed by: EMERGENCY MEDICINE

## 2020-04-18 PROCEDURE — 96361 HYDRATE IV INFUSION ADD-ON: CPT

## 2020-04-18 PROCEDURE — 80053 COMPREHEN METABOLIC PANEL: CPT | Performed by: EMERGENCY MEDICINE

## 2020-04-18 PROCEDURE — 81001 URINALYSIS AUTO W/SCOPE: CPT | Performed by: EMERGENCY MEDICINE

## 2020-04-18 RX ORDER — ONDANSETRON 2 MG/ML
4 INJECTION INTRAMUSCULAR; INTRAVENOUS ONCE
Status: COMPLETED | OUTPATIENT
Start: 2020-04-18 | End: 2020-04-18

## 2020-04-18 RX ORDER — KETOROLAC TROMETHAMINE 30 MG/ML
15 INJECTION, SOLUTION INTRAMUSCULAR; INTRAVENOUS ONCE
Status: COMPLETED | OUTPATIENT
Start: 2020-04-18 | End: 2020-04-18

## 2020-04-18 RX ORDER — ONDANSETRON 4 MG/1
4 TABLET, ORALLY DISINTEGRATING ORAL EVERY 6 HOURS PRN
Qty: 15 TABLET | Refills: 0 | Status: ON HOLD | OUTPATIENT
Start: 2020-04-18 | End: 2020-04-21

## 2020-04-18 RX ORDER — MORPHINE SULFATE 4 MG/ML
INJECTION, SOLUTION INTRAMUSCULAR; INTRAVENOUS
Status: DISCONTINUED
Start: 2020-04-18 | End: 2020-04-18

## 2020-04-18 RX ORDER — HYDROCODONE BITARTRATE AND ACETAMINOPHEN 5; 325 MG/1; MG/1
1-2 TABLET ORAL EVERY 6 HOURS PRN
Qty: 10 TABLET | Refills: 0 | Status: SHIPPED | OUTPATIENT
Start: 2020-04-18 | End: 2020-04-25

## 2020-04-18 RX ORDER — MORPHINE SULFATE 4 MG/ML
4 INJECTION, SOLUTION INTRAMUSCULAR; INTRAVENOUS ONCE
Status: COMPLETED | OUTPATIENT
Start: 2020-04-18 | End: 2020-04-18

## 2020-04-18 NOTE — ED INITIAL ASSESSMENT (HPI)
Patient to ED for RLQ pain, nausea, and dry heaving. Denies fevers. Denies diarrhea. States pain started a couple of days ago, but became worse after breakfast this morning.

## 2020-04-18 NOTE — ED PROVIDER NOTES
Patient Seen in: [facility] Emergency Department      History   Patient presents with:  Nausea/Vomiting/Diarrhea    Stated Complaint: abd pain, started a couple of days ago,yes n/v/d, no fever    HPI    53-year-old female presents for evaluation of Monthly or less      Drinks per session: 1 or 2      Binge frequency: Never      Comment: seldom    Drug use:  No             Review of Systems    Positive for stated complaint: abd pain, started a couple of days ago,yes n/v/d, no fever  Other systems are a limits   LIPASE - Normal   CBC WITH DIFFERENTIAL WITH PLATELET    Narrative: The following orders were created for panel order CBC WITH DIFFERENTIAL WITH PLATELET.   Procedure                               Abnormality         Status thickening along the right ureter, and fat stranding are seen along the right ureter secondary to a 6 mm obstructing stone in the mid to distal right ureter positioned at the approximate S1 level.   Superimposed developing pyelonephritis is not entirely exc this visit.     Follow-up:  MD Yang [phone]    Schedule an appointment as soon as possible for a visit in 2 days            Medications Prescribed:  Current Discharge Medication List    STA

## 2020-04-20 ENCOUNTER — TELEPHONE (OUTPATIENT)
Dept: SURGERY | Facility: CLINIC | Age: 45
End: 2020-04-20

## 2020-04-20 ENCOUNTER — VIRTUAL PHONE E/M (OUTPATIENT)
Dept: SURGERY | Facility: CLINIC | Age: 45
End: 2020-04-20

## 2020-04-20 DIAGNOSIS — N20.1 RIGHT URETERAL CALCULUS: Primary | ICD-10-CM

## 2020-04-20 DIAGNOSIS — R10.9 FLANK PAIN: ICD-10-CM

## 2020-04-20 DIAGNOSIS — N20.0 KIDNEY STONE: Primary | ICD-10-CM

## 2020-04-20 PROCEDURE — 99212 OFFICE O/P EST SF 10 MIN: CPT | Performed by: UROLOGY

## 2020-04-20 NOTE — TELEPHONE ENCOUNTER
Cystoscopy,right ureteroscopy, laser lithotripsy and possible right ureteral stent insertion scheduled at [facility] for 4/21/20. LM for pt on identified VM that the hospital will be calling her with instructions. Call back with any questions.

## 2020-04-20 NOTE — PROGRESS NOTES
Virtual Telephone Check-In    Jb Gonsales verbally consents to a Virtual/Telephone Check-In visit on 04/20/20. Patient understands and accepts financial responsibility for any deductible, co-insurance and/or co-pays associated with this service.     Du FINDINGS: LUNG BASE:  Mild scarring/atelectasis LIVER:  Unremarkable. BILIARY:  Unremarkable. SPLEEN:  Unremarkable. PANCREAS:  Unremarkable. ADRENALS:  Unremarkable. KIDNEYS:  3 mm nonobstructing stone in the lower pole left kidney.   Subcentimeter hypoden right ureteroscopy, laser lithotripsy and possible stent insertion only if necessary under general anesthesia tomorrow.       Stephanie Cruz MD

## 2020-04-20 NOTE — TELEPHONE ENCOUNTER
Pt was seen [facility] ED on 4/18 for kidney stone, pt asking for appointment soon. Please call thank you.

## 2020-04-21 ENCOUNTER — ANESTHESIA EVENT (OUTPATIENT)
Dept: SURGERY | Facility: HOSPITAL | Age: 45
End: 2020-04-21
Payer: COMMERCIAL

## 2020-04-21 ENCOUNTER — HOSPITAL ENCOUNTER (OUTPATIENT)
Facility: HOSPITAL | Age: 45
Setting detail: HOSPITAL OUTPATIENT SURGERY
Discharge: HOME | End: 2020-04-21
Attending: UROLOGY | Admitting: UROLOGY
Payer: COMMERCIAL

## 2020-04-21 ENCOUNTER — ANESTHESIA (OUTPATIENT)
Dept: SURGERY | Facility: HOSPITAL | Age: 45
End: 2020-04-21
Payer: COMMERCIAL

## 2020-04-21 ENCOUNTER — APPOINTMENT (OUTPATIENT)
Dept: GENERAL RADIOLOGY | Facility: HOSPITAL | Age: 45
End: 2020-04-21
Attending: UROLOGY
Payer: COMMERCIAL

## 2020-04-21 ENCOUNTER — TELEPHONE (OUTPATIENT)
Dept: SURGERY | Facility: CLINIC | Age: 45
End: 2020-04-21

## 2020-04-21 VITALS
TEMPERATURE: 98 F | RESPIRATION RATE: 12 BRPM | BODY MASS INDEX: 26.67 KG/M2 | SYSTOLIC BLOOD PRESSURE: 116 MMHG | HEART RATE: 86 BPM | HEIGHT: 63 IN | OXYGEN SATURATION: 98 % | WEIGHT: 150.5 LBS | DIASTOLIC BLOOD PRESSURE: 82 MMHG

## 2020-04-21 DIAGNOSIS — R10.9 FLANK PAIN: ICD-10-CM

## 2020-04-21 DIAGNOSIS — N20.0 KIDNEY STONE: ICD-10-CM

## 2020-04-21 PROCEDURE — 0TC68ZZ EXTIRPATION OF MATTER FROM RIGHT URETER, VIA NATURAL OR ARTIFICIAL OPENING ENDOSCOPIC: ICD-10-PCS | Performed by: UROLOGY

## 2020-04-21 PROCEDURE — 52356 CYSTO/URETERO W/LITHOTRIPSY: CPT | Performed by: UROLOGY

## 2020-04-21 RX ORDER — NALOXONE HYDROCHLORIDE 0.4 MG/ML
80 INJECTION, SOLUTION INTRAMUSCULAR; INTRAVENOUS; SUBCUTANEOUS AS NEEDED
Status: DISCONTINUED | OUTPATIENT
Start: 2020-04-21 | End: 2020-04-21

## 2020-04-21 RX ORDER — NEOSTIGMINE METHYLSULFATE 1 MG/ML
INJECTION INTRAVENOUS AS NEEDED
Status: DISCONTINUED | OUTPATIENT
Start: 2020-04-21 | End: 2020-04-21 | Stop reason: SURG

## 2020-04-21 RX ORDER — ONDANSETRON 2 MG/ML
4 INJECTION INTRAMUSCULAR; INTRAVENOUS AS NEEDED
Status: DISCONTINUED | OUTPATIENT
Start: 2020-04-21 | End: 2020-04-21

## 2020-04-21 RX ORDER — MEPERIDINE HYDROCHLORIDE 25 MG/ML
12.5 INJECTION INTRAMUSCULAR; INTRAVENOUS; SUBCUTANEOUS AS NEEDED
Status: DISCONTINUED | OUTPATIENT
Start: 2020-04-21 | End: 2020-04-21

## 2020-04-21 RX ORDER — MIDAZOLAM HYDROCHLORIDE 1 MG/ML
INJECTION INTRAMUSCULAR; INTRAVENOUS AS NEEDED
Status: DISCONTINUED | OUTPATIENT
Start: 2020-04-21 | End: 2020-04-21 | Stop reason: SURG

## 2020-04-21 RX ORDER — ROCURONIUM BROMIDE 10 MG/ML
INJECTION, SOLUTION INTRAVENOUS AS NEEDED
Status: DISCONTINUED | OUTPATIENT
Start: 2020-04-21 | End: 2020-04-21 | Stop reason: SURG

## 2020-04-21 RX ORDER — KETOROLAC TROMETHAMINE 30 MG/ML
INJECTION, SOLUTION INTRAMUSCULAR; INTRAVENOUS AS NEEDED
Status: DISCONTINUED | OUTPATIENT
Start: 2020-04-21 | End: 2020-04-21 | Stop reason: SURG

## 2020-04-21 RX ORDER — DEXAMETHASONE SODIUM PHOSPHATE 4 MG/ML
VIAL (ML) INJECTION AS NEEDED
Status: DISCONTINUED | OUTPATIENT
Start: 2020-04-21 | End: 2020-04-21 | Stop reason: SURG

## 2020-04-21 RX ORDER — SODIUM CHLORIDE, SODIUM LACTATE, POTASSIUM CHLORIDE, CALCIUM CHLORIDE 600; 310; 30; 20 MG/100ML; MG/100ML; MG/100ML; MG/100ML
INJECTION, SOLUTION INTRAVENOUS CONTINUOUS
Status: DISCONTINUED | OUTPATIENT
Start: 2020-04-21 | End: 2020-04-21

## 2020-04-21 RX ORDER — LABETALOL HYDROCHLORIDE 5 MG/ML
5 INJECTION, SOLUTION INTRAVENOUS EVERY 5 MIN PRN
Status: DISCONTINUED | OUTPATIENT
Start: 2020-04-21 | End: 2020-04-21

## 2020-04-21 RX ORDER — ACETAMINOPHEN 500 MG
1000 TABLET ORAL EVERY 6 HOURS PRN
COMMUNITY
End: 2020-08-27

## 2020-04-21 RX ORDER — LIDOCAINE HYDROCHLORIDE 10 MG/ML
INJECTION, SOLUTION EPIDURAL; INFILTRATION; INTRACAUDAL; PERINEURAL AS NEEDED
Status: DISCONTINUED | OUTPATIENT
Start: 2020-04-21 | End: 2020-04-21 | Stop reason: SURG

## 2020-04-21 RX ORDER — HYDROCODONE BITARTRATE AND ACETAMINOPHEN 5; 325 MG/1; MG/1
2 TABLET ORAL AS NEEDED
Status: DISCONTINUED | OUTPATIENT
Start: 2020-04-21 | End: 2020-04-21

## 2020-04-21 RX ORDER — CLINDAMYCIN PHOSPHATE 900 MG/50ML
900 INJECTION INTRAVENOUS ONCE
Status: COMPLETED | OUTPATIENT
Start: 2020-04-21 | End: 2020-04-21

## 2020-04-21 RX ORDER — MIDAZOLAM HYDROCHLORIDE 1 MG/ML
1 INJECTION INTRAMUSCULAR; INTRAVENOUS EVERY 5 MIN PRN
Status: DISCONTINUED | OUTPATIENT
Start: 2020-04-21 | End: 2020-04-21

## 2020-04-21 RX ORDER — HYDROCODONE BITARTRATE AND ACETAMINOPHEN 5; 325 MG/1; MG/1
1 TABLET ORAL AS NEEDED
Status: DISCONTINUED | OUTPATIENT
Start: 2020-04-21 | End: 2020-04-21

## 2020-04-21 RX ORDER — HYDROMORPHONE HYDROCHLORIDE 1 MG/ML
0.4 INJECTION, SOLUTION INTRAMUSCULAR; INTRAVENOUS; SUBCUTANEOUS EVERY 5 MIN PRN
Status: DISCONTINUED | OUTPATIENT
Start: 2020-04-21 | End: 2020-04-21

## 2020-04-21 RX ORDER — ACETAMINOPHEN 500 MG
1000 TABLET ORAL ONCE
Status: DISCONTINUED | OUTPATIENT
Start: 2020-04-21 | End: 2020-04-21 | Stop reason: HOSPADM

## 2020-04-21 RX ORDER — ONDANSETRON 2 MG/ML
INJECTION INTRAMUSCULAR; INTRAVENOUS AS NEEDED
Status: DISCONTINUED | OUTPATIENT
Start: 2020-04-21 | End: 2020-04-21 | Stop reason: SURG

## 2020-04-21 RX ORDER — GLYCOPYRROLATE 0.2 MG/ML
INJECTION, SOLUTION INTRAMUSCULAR; INTRAVENOUS AS NEEDED
Status: DISCONTINUED | OUTPATIENT
Start: 2020-04-21 | End: 2020-04-21 | Stop reason: SURG

## 2020-04-21 RX ORDER — METOCLOPRAMIDE HYDROCHLORIDE 5 MG/ML
10 INJECTION INTRAMUSCULAR; INTRAVENOUS AS NEEDED
Status: DISCONTINUED | OUTPATIENT
Start: 2020-04-21 | End: 2020-04-21

## 2020-04-21 RX ADMIN — ONDANSETRON 4 MG: 2 INJECTION INTRAMUSCULAR; INTRAVENOUS at 09:37:00

## 2020-04-21 RX ADMIN — KETOROLAC TROMETHAMINE 30 MG: 30 INJECTION, SOLUTION INTRAMUSCULAR; INTRAVENOUS at 09:40:00

## 2020-04-21 RX ADMIN — SODIUM CHLORIDE, SODIUM LACTATE, POTASSIUM CHLORIDE, CALCIUM CHLORIDE: 600; 310; 30; 20 INJECTION, SOLUTION INTRAVENOUS at 09:12:00

## 2020-04-21 RX ADMIN — ROCURONIUM BROMIDE 10 MG: 10 INJECTION, SOLUTION INTRAVENOUS at 09:26:00

## 2020-04-21 RX ADMIN — SODIUM CHLORIDE, SODIUM LACTATE, POTASSIUM CHLORIDE, CALCIUM CHLORIDE: 600; 310; 30; 20 INJECTION, SOLUTION INTRAVENOUS at 10:11:00

## 2020-04-21 RX ADMIN — GLYCOPYRROLATE 0.6 MG: 0.2 INJECTION, SOLUTION INTRAMUSCULAR; INTRAVENOUS at 09:45:00

## 2020-04-21 RX ADMIN — MIDAZOLAM HYDROCHLORIDE 2 MG: 1 INJECTION INTRAMUSCULAR; INTRAVENOUS at 09:12:00

## 2020-04-21 RX ADMIN — DEXAMETHASONE SODIUM PHOSPHATE 8 MG: 4 MG/ML VIAL (ML) INJECTION at 09:22:00

## 2020-04-21 RX ADMIN — NEOSTIGMINE METHYLSULFATE 3 MG: 1 INJECTION INTRAVENOUS at 09:45:00

## 2020-04-21 RX ADMIN — CLINDAMYCIN PHOSPHATE 900 MG: 900 INJECTION INTRAVENOUS at 09:13:00

## 2020-04-21 RX ADMIN — LIDOCAINE HYDROCHLORIDE 30 MG: 10 INJECTION, SOLUTION EPIDURAL; INFILTRATION; INTRACAUDAL; PERINEURAL at 09:17:00

## 2020-04-21 NOTE — TELEPHONE ENCOUNTER
Please schedule a telephone visit with patient in about 2 weeks to review chemical analysis of the stone and discuss future stone prevention.

## 2020-04-21 NOTE — H&P
[address]  Mali Whaley  [phone]       Herrera Guillory Patient Status:  Hospital Outpatient Surgery    1975 MRN [id number]   Location [address] [place] [address] Attending Patrick Maynard MD   Hosp Day # 0 PCP WISDOM TEETH REMOVED  2001    wisdom teeth removal x 4     Family History   Problem Relation Age of Onset   • Lipids Father    • Hypertension Father    • Hypertension Mother    • Lipids Mother    • Diabetes Maternal Grandfather    • Cancer Maternal Wai Mcdowell Recent Labs   Lab 04/18/20  1403   COLORUR Yellow   CLARITY Clear   SPECGRAVITY 1.051*   GLUUR Negative   BILUR Negative   KETUR Negative   BLOODURINE Large*   PHURINE 6.0   PROUR Negative   UROBILINOGEN <2.0   NITRITE Negative   LEUUR Negative   WBCU recommended. AORTA/VASCULAR:  Unremarkable. RETROPERITONEUM:  Unremarkable. BOWEL/MESENTERY:  Unremarkable appendix. Scattered feces in the colon. No large or small bowel dilatation. No free air or free fluid. ABDOMINAL WALL:  Unremarkable.  PELVIC ORGAN circumstance the ureter may be perforated or injured and a ureteral stent may be left in place for several weeks. The patient understands and agrees to proceed. Thank you for allowing me to participate in the care of your patient. Johnella Frankel.  Archana Charles

## 2020-04-21 NOTE — OPERATIVE REPORT
Operative Note    Patient Name: Ismael Camarena    Date of Procedure: 4/21/2020    Preoperative Diagnosis: Distal right ureteral calculus with flank pain    Postoperative Diagnosis: Same    Primary Surgeon: Marlyn Cabrales. Shawanda Echavarria M.D.      Procedure Performed: Cy catheter was then inserted into the distal right ureter and contrast was then instilled demonstrating a filling defect in the distal ureter.   Then 0.035 Glidewire was inserted through the ureteral catheter beyond the obstructing stone into the proximal ure

## 2020-04-21 NOTE — ANESTHESIA PREPROCEDURE EVALUATION
PRE-OP EVALUATION    Patient Name: Herrera Guillory    Pre-op Diagnosis: Kidney stone [N20.0]  Flank pain [R10.9]    Procedure(s):  Cystoscopy,right ureteroscopy, laser lithotripsy and possible right ureteral stent insertion    Surgeon(s) and Role:     * [facility] (+) irritable bowel syndrome     Cardiovascular        Exercise tolerance: good     MET: >4                                           Endo/Other                                  Pulmonary                           Neuro/Psych      (+) depress 04/18/2020    HCT 39.2 03/13/2020    MCV 85.5 04/18/2020    MCV 84.3 03/13/2020    MCH 28.5 04/18/2020    MCH 28.4 03/13/2020    MCHC 33.3 04/18/2020    MCHC 33.7 03/13/2020    RDW 12.4 04/18/2020    RDW 12.8 03/13/2020    .0 04/18/2020     0

## 2020-04-21 NOTE — ANESTHESIA PROCEDURE NOTES
Airway  Date/Time: 4/21/2020 9:17 AM  Urgency: elective    Airway not difficult    General Information and Staff    Patient location during procedure: OR  Anesthesiologist: Masha Ngo MD  Performed: anesthesiologist     Indications and Patient [place]

## 2020-04-21 NOTE — ANESTHESIA POSTPROCEDURE EVALUATION
[facility]    Nate Benites Patient Status:  Hospital Outpatient Surgery   Age/Gender 45year old female MRN [id number]   [facility] SURGERY Attending Lizette Campos MD   Hosp Day # 0 PCP Ga Sen MD       Anesthesia Post-op Not

## 2020-05-04 ENCOUNTER — APPOINTMENT (OUTPATIENT)
Dept: SURGERY | Facility: CLINIC | Age: 45
End: 2020-05-04
Payer: COMMERCIAL

## 2020-05-04 ENCOUNTER — TELEPHONE (OUTPATIENT)
Dept: SURGERY | Facility: CLINIC | Age: 45
End: 2020-05-04

## 2020-05-04 DIAGNOSIS — N20.0 RENAL CALCULUS, LEFT: Primary | ICD-10-CM

## 2020-05-04 DIAGNOSIS — Z87.442 HISTORY OF KIDNEY STONES: Primary | ICD-10-CM

## 2020-05-04 DIAGNOSIS — Z87.442 HISTORY OF KIDNEY STONES: ICD-10-CM

## 2020-05-04 PROCEDURE — 99213 OFFICE O/P EST LOW 20 MIN: CPT | Performed by: UROLOGY

## 2020-05-04 NOTE — TELEPHONE ENCOUNTER
Virtual Telephone Check-In    Liv Rich verbally consents to a Virtual/Telephone Check-In visit on 05/04/20. Patient understands and accepts financial responsibility for any deductible, co-insurance and/or co-pays associated with this service.     Du diseases. Sometimes prescription medications are needed to adjust calcium and uric acid levels in blood and urine. I also discussed various surgical procedures for treatments for large, multiple or symptomatic stones.   The patient expressed an understand

## 2020-05-05 ENCOUNTER — TELEPHONE (OUTPATIENT)
Dept: SURGERY | Facility: CLINIC | Age: 45
End: 2020-05-05

## 2020-05-05 NOTE — TELEPHONE ENCOUNTER
Called patient to set up a one year appointment with Dr Chauncey Morales and to remind her KUB prior to visit. Patient is scheduled for her next follow up on 5/6/2021 10:15am.     No questions at this time.

## 2020-10-27 ENCOUNTER — APPOINTMENT (OUTPATIENT)
Dept: GENERAL RADIOLOGY | Facility: HOSPITAL | Age: 45
End: 2020-10-27
Attending: EMERGENCY MEDICINE
Payer: COMMERCIAL

## 2020-10-27 ENCOUNTER — HOSPITAL ENCOUNTER (EMERGENCY)
Facility: HOSPITAL | Age: 45
Discharge: HOME | End: 2020-10-27
Attending: EMERGENCY MEDICINE
Payer: COMMERCIAL

## 2020-10-27 VITALS
HEART RATE: 69 BPM | RESPIRATION RATE: 15 BRPM | SYSTOLIC BLOOD PRESSURE: 101 MMHG | WEIGHT: 148 LBS | HEIGHT: 63 IN | DIASTOLIC BLOOD PRESSURE: 71 MMHG | BODY MASS INDEX: 26.22 KG/M2 | TEMPERATURE: 99 F | OXYGEN SATURATION: 100 %

## 2020-10-27 DIAGNOSIS — R07.9 CHEST PAIN OF UNCERTAIN ETIOLOGY: Primary | ICD-10-CM

## 2020-10-27 PROCEDURE — 93010 ELECTROCARDIOGRAM REPORT: CPT

## 2020-10-27 PROCEDURE — 99285 EMERGENCY DEPT VISIT HI MDM: CPT

## 2020-10-27 PROCEDURE — 84484 ASSAY OF TROPONIN QUANT: CPT | Performed by: EMERGENCY MEDICINE

## 2020-10-27 PROCEDURE — 71045 X-RAY EXAM CHEST 1 VIEW: CPT | Performed by: EMERGENCY MEDICINE

## 2020-10-27 PROCEDURE — 93005 ELECTROCARDIOGRAM TRACING: CPT

## 2020-10-27 PROCEDURE — 85025 COMPLETE CBC W/AUTO DIFF WBC: CPT | Performed by: EMERGENCY MEDICINE

## 2020-10-27 PROCEDURE — 80053 COMPREHEN METABOLIC PANEL: CPT | Performed by: EMERGENCY MEDICINE

## 2020-10-27 PROCEDURE — 99284 EMERGENCY DEPT VISIT MOD MDM: CPT

## 2020-10-27 PROCEDURE — 36415 COLL VENOUS BLD VENIPUNCTURE: CPT

## 2020-10-27 RX ORDER — NITROGLYCERIN 0.4 MG/1
0.4 TABLET SUBLINGUAL EVERY 5 MIN PRN
Status: DISCONTINUED | OUTPATIENT
Start: 2020-10-27 | End: 2020-10-28

## 2020-10-27 RX ORDER — ASPIRIN 81 MG/1
324 TABLET, CHEWABLE ORAL ONCE
Status: COMPLETED | OUTPATIENT
Start: 2020-10-27 | End: 2020-10-27

## 2020-10-27 NOTE — ED INITIAL ASSESSMENT (HPI)
PT PO ED FROM HOME WITH NON RADIATING MID STERNAL CHEST PAIN THAT STARTED 3 HOURS ADO. PT WAS SITTING AT HOME WHEN CONTINUOUS CHEST PAIN STARTED.  DENIES N/V, DENIES SOB

## 2020-10-28 ENCOUNTER — ORDER TRANSCRIPTION (OUTPATIENT)
Dept: ADMINISTRATIVE | Facility: HOSPITAL | Age: 45
End: 2020-10-28

## 2020-10-28 DIAGNOSIS — Z01.812 PRE-PROCEDURE LAB EXAM: ICD-10-CM

## 2020-10-28 DIAGNOSIS — Z20.822 ENCOUNTER FOR LABORATORY TESTING FOR COVID-19 VIRUS: Primary | ICD-10-CM

## 2020-10-28 NOTE — ED PROVIDER NOTES
Patient Seen in: [facility] Emergency Department      History   Patient presents with:  Chest Pain Angina    Stated Complaint:     HPI    45 yr old F here with c/o lower anterior chest pain radiating to left shoulder and shoulder blade, neck/jaw reg Frequency: Monthly or less      Drinks per session: 1 or 2      Binge frequency: Never      Comment: seldom    Drug use: No             Review of Systems    Positive for stated complaint:   Other systems are as noted in HPI.   Constitutional and vital signs General: No focal deficit present. Mental Status: She is alert and oriented to person, place, and time. Mental status is at baseline.       Comments: Lifts all extremities against gravity, face symmetric, speech clear    Psychiatric:         Mood and under    Risk Factors     +2 for 3 or more or history of atherosclerotic disease  +1 for 1-2  +0 for 0  Hypercholesterolemia  Hypertension  Diabetes Mellitus  Cigarette smoking   Positive family history  Obesity (Body mass index is 26.22 kg/m².)    Tro

## 2020-10-31 ENCOUNTER — LAB ENCOUNTER (OUTPATIENT)
Dept: LAB | Facility: HOSPITAL | Age: 45
End: 2020-10-31
Attending: EMERGENCY MEDICINE
Payer: COMMERCIAL

## 2020-10-31 DIAGNOSIS — Z20.822 ENCOUNTER FOR LABORATORY TESTING FOR COVID-19 VIRUS: ICD-10-CM

## 2020-10-31 DIAGNOSIS — Z01.812 PRE-PROCEDURE LAB EXAM: ICD-10-CM

## 2020-11-03 ENCOUNTER — HOSPITAL ENCOUNTER (OUTPATIENT)
Dept: CV DIAGNOSTICS | Facility: HOSPITAL | Age: 45
Discharge: HOME | End: 2020-11-03
Attending: EMERGENCY MEDICINE
Payer: COMMERCIAL

## 2020-11-03 ENCOUNTER — HOSPITAL ENCOUNTER (EMERGENCY)
Facility: HOSPITAL | Age: 45
Discharge: HOME | End: 2020-11-03
Attending: EMERGENCY MEDICINE
Payer: COMMERCIAL

## 2020-11-03 ENCOUNTER — APPOINTMENT (OUTPATIENT)
Dept: GENERAL RADIOLOGY | Facility: HOSPITAL | Age: 45
End: 2020-11-03
Attending: EMERGENCY MEDICINE
Payer: COMMERCIAL

## 2020-11-03 VITALS
DIASTOLIC BLOOD PRESSURE: 77 MMHG | RESPIRATION RATE: 20 BRPM | BODY MASS INDEX: 26.4 KG/M2 | WEIGHT: 149 LBS | TEMPERATURE: 99 F | OXYGEN SATURATION: 94 % | HEART RATE: 74 BPM | HEIGHT: 63 IN | SYSTOLIC BLOOD PRESSURE: 105 MMHG

## 2020-11-03 DIAGNOSIS — R07.9 CHEST PAIN OF UNCERTAIN ETIOLOGY: Primary | ICD-10-CM

## 2020-11-03 DIAGNOSIS — R07.9 CHEST PAIN OF UNCERTAIN ETIOLOGY: ICD-10-CM

## 2020-11-03 PROCEDURE — 99285 EMERGENCY DEPT VISIT HI MDM: CPT

## 2020-11-03 PROCEDURE — 84484 ASSAY OF TROPONIN QUANT: CPT | Performed by: EMERGENCY MEDICINE

## 2020-11-03 PROCEDURE — 93010 ELECTROCARDIOGRAM REPORT: CPT

## 2020-11-03 PROCEDURE — 93350 STRESS TTE ONLY: CPT | Performed by: EMERGENCY MEDICINE

## 2020-11-03 PROCEDURE — 93005 ELECTROCARDIOGRAM TRACING: CPT

## 2020-11-03 PROCEDURE — 36415 COLL VENOUS BLD VENIPUNCTURE: CPT

## 2020-11-03 PROCEDURE — 93017 CV STRESS TEST TRACING ONLY: CPT | Performed by: EMERGENCY MEDICINE

## 2020-11-03 PROCEDURE — 85025 COMPLETE CBC W/AUTO DIFF WBC: CPT | Performed by: EMERGENCY MEDICINE

## 2020-11-03 PROCEDURE — 93018 CV STRESS TEST I&R ONLY: CPT | Performed by: EMERGENCY MEDICINE

## 2020-11-03 PROCEDURE — 80053 COMPREHEN METABOLIC PANEL: CPT | Performed by: EMERGENCY MEDICINE

## 2020-11-03 PROCEDURE — 99284 EMERGENCY DEPT VISIT MOD MDM: CPT

## 2020-11-03 PROCEDURE — 85379 FIBRIN DEGRADATION QUANT: CPT | Performed by: EMERGENCY MEDICINE

## 2020-11-03 PROCEDURE — 71045 X-RAY EXAM CHEST 1 VIEW: CPT | Performed by: EMERGENCY MEDICINE

## 2020-11-03 NOTE — PROGRESS NOTES
Patient completed a SE. Dr. Andres Eller was made aware that patient has had ongoing CP 4/10 since her ER visit on 10-27. Patient walked for 8 min with slight change in CP. Patient OK'd to go home and they will follow up with her.

## 2020-11-04 NOTE — ED INITIAL ASSESSMENT (HPI)
has constant midsternal chest pain x 1 week. Underwent a stress test this morning.  pain is unrelenting so came in.

## 2020-11-04 NOTE — ED PROVIDER NOTES
Patient Seen in: [facility] Emergency Department      History   Patient presents with:  Chest Pain Angina    Stated Complaint: chest pain    HPI    This is a pleasant 68-year-old female presenting with chest pain.   This is pain that is the middle of 0.0 standard drinks      Frequency: Monthly or less      Drinks per session: 1 or 2      Binge frequency: Never      Comment: seldom    Drug use:  No             Review of Systems    Positive for stated complaint: chest pain  Other systems are as noted in H CBC W/ DIFFERENTIAL[[id number]]          Abnormal            Final result                 Please view results for these tests on the individual orders.    RAINBOW DRAW BLUE   RAINBOW DRAW LAVENDER   RAINBOW DRAW LIGHT GREEN   RAINBOW DRAW GOLD     EKG    R

## 2020-11-08 NOTE — PROGRESS NOTES
Results released to [facility] with providers notes   Pt has viewed [facility] results  Nothing further needed from MD or nurse. Closing encounter.

## 2021-04-27 ENCOUNTER — TELEPHONE (OUTPATIENT)
Dept: SURGERY | Facility: CLINIC | Age: 46
End: 2021-04-27

## 2021-04-27 NOTE — TELEPHONE ENCOUNTER
RN called patient to remind her of her appt on 5/6 with Dr Ethan Salas with [facility] prior. Patient currently not set up appt yet. But assured, she will call and have it completed. All questions answered. Agreeable to plans.

## 2021-05-06 ENCOUNTER — OFFICE VISIT (OUTPATIENT)
Dept: SURGERY | Facility: CLINIC | Age: 46
End: 2021-05-06
Payer: COMMERCIAL

## 2021-05-06 VITALS — DIASTOLIC BLOOD PRESSURE: 68 MMHG | SYSTOLIC BLOOD PRESSURE: 92 MMHG | HEART RATE: 73 BPM | TEMPERATURE: 97 F

## 2021-05-06 DIAGNOSIS — N20.0 KIDNEY STONE: ICD-10-CM

## 2021-05-06 DIAGNOSIS — R82.90 URINE FINDING: Primary | ICD-10-CM

## 2021-05-06 PROCEDURE — 3078F DIAST BP <80 MM HG: CPT | Performed by: UROLOGY

## 2021-05-06 PROCEDURE — 99213 OFFICE O/P EST LOW 20 MIN: CPT | Performed by: UROLOGY

## 2021-05-06 PROCEDURE — 3074F SYST BP LT 130 MM HG: CPT | Performed by: UROLOGY

## 2021-05-06 RX ORDER — UBROGEPANT 100 MG/1
TABLET ORAL
COMMUNITY
Start: 2021-04-26

## 2021-05-06 NOTE — PROGRESS NOTES
Rooming Clinician:     Nabila Barreto is a 39year old female. Patient presents with:   Follow - Up: yearly follow up with [facility] prior, hx of kidney stones  [facility]:    DATE OF SERVICE: 05.03.2021     XR ABDOMEN (1 VIEW) (CPT=74018)   CLINICAL INDICATION: Renal Take 1 tablet by mouth daily. • Desonide 0.05 % External Cream Apply to AA on face BID x 2 weeks, then stop for 2 weeks and restart as needed. 30 g 1   • Ketorolac Tromethamine (SPRIX) 15.75 MG/SPRAY Nasal Solution 15.75 mg by Nasal route.        • bacl hepatic flexure)   • WISDOM TEETH REMOVED  2001    wisdom teeth removal x 4      Social History:  Social History    Tobacco Use      Smoking status: Never Smoker      Smokeless tobacco: Never Used    Vaping Use      Vaping Use: Never used    Alcohol use:  Florentino Jones the determination of these causative factors. A 24 hour urine collection for chemical analysis if often useful to identify risk factors for stone growth and recurrence.  I explained the medical treatment of stone disease which includes liberal fluid hydrat

## 2021-07-29 ENCOUNTER — LAB ENCOUNTER (OUTPATIENT)
Dept: LAB | Age: 46
End: 2021-07-29
Attending: UROLOGY
Payer: COMMERCIAL

## 2021-07-29 DIAGNOSIS — N20.0 KIDNEY STONE: ICD-10-CM

## 2021-07-29 PROCEDURE — 84560 ASSAY OF URINE/URIC ACID: CPT

## 2021-07-29 PROCEDURE — 84105 ASSAY OF URINE PHOSPHORUS: CPT

## 2021-07-29 PROCEDURE — 82507 ASSAY OF CITRATE: CPT

## 2021-07-29 PROCEDURE — 83945 ASSAY OF OXALATE: CPT

## 2021-07-29 PROCEDURE — 83735 ASSAY OF MAGNESIUM: CPT

## 2021-07-29 PROCEDURE — 82436 ASSAY OF URINE CHLORIDE: CPT

## 2021-07-29 PROCEDURE — 84300 ASSAY OF URINE SODIUM: CPT

## 2021-07-29 PROCEDURE — 82340 ASSAY OF CALCIUM IN URINE: CPT

## 2021-07-29 PROCEDURE — 83986 ASSAY PH BODY FLUID NOS: CPT

## 2021-07-29 PROCEDURE — 84133 ASSAY OF URINE POTASSIUM: CPT

## 2021-08-05 LAB
CALCIUM URINE - PER 24H: 170 MG/D
CALCIUM URINE - PER VOL: 10.3 MG/DL
CHLORIDE URINE - PER 24H: 63 MMOL/D
CHLORIDE URINE - PER VOL.: 38 MMOL/L
CITRIC ACID, URINE - MG/DAY: 376 MG/D
CITRIC ACID, URINE - MG/L: 228 MG/L
CREATININE, URINE - PER 24H: 1106 MG/D
CREATININE, URINE - PER VOLUME: 67 MG/DL
HOURS COLLECTED: 24 HR
MAGNESIUM URINE - PER 24H: 66 MG/D
MAGNESIUM URINE - PER VOL: 4 MG/DL
OXALATE, URINE - MG/DAY: 25 MG/D
OXALATE, URINE - MG/L: 15 MG/L
PH, URINE: 5.99
PHOSPHORUS URINE - PER 24H: 808 MG/D
PHOSPHORUS URINE - PER VOL: 49 MG/DL
POTASSIUM URINE - PER 24H: 21 MMOL/D
POTASSIUM URINE - PER VOL.: 13 MMOL/L
SODIUM URINE - PER VOL.: 50 MMOL/L
SODIUM URINE -PER 24H: 82 MMOL/D
TOTAL VOLUME: 1650 ML
URIC ACID URINE - PER 24H: 383 MG/D
URIC ACID URINE - PER VOL: 23.2 MG/DL

## 2021-08-05 NOTE — PROGRESS NOTES
Your recent 24-hour urine collection shows total volume of 1650 cc which is less than previous 1 of 2 years ago. Urinary calcium is normal oxalate is normal.  Urinary citrate has been decreasing over time down to 376.   Recommend increasing urinary citrate

## 2021-09-19 ENCOUNTER — APPOINTMENT (OUTPATIENT)
Dept: GENERAL RADIOLOGY | Facility: HOSPITAL | Age: 46
End: 2021-09-19
Attending: EMERGENCY MEDICINE
Payer: COMMERCIAL

## 2021-09-19 ENCOUNTER — APPOINTMENT (OUTPATIENT)
Dept: CT IMAGING | Facility: HOSPITAL | Age: 46
End: 2021-09-19
Attending: EMERGENCY MEDICINE
Payer: COMMERCIAL

## 2021-09-19 ENCOUNTER — HOSPITAL ENCOUNTER (EMERGENCY)
Facility: HOSPITAL | Age: 46
Discharge: HOME | End: 2021-09-19
Attending: EMERGENCY MEDICINE
Payer: COMMERCIAL

## 2021-09-19 VITALS
HEART RATE: 78 BPM | TEMPERATURE: 97 F | RESPIRATION RATE: 20 BRPM | DIASTOLIC BLOOD PRESSURE: 80 MMHG | SYSTOLIC BLOOD PRESSURE: 107 MMHG | HEIGHT: 63 IN | BODY MASS INDEX: 25.69 KG/M2 | OXYGEN SATURATION: 96 % | WEIGHT: 145 LBS

## 2021-09-19 DIAGNOSIS — R05.3 CHRONIC COUGH: Primary | ICD-10-CM

## 2021-09-19 LAB
ALBUMIN SERPL-MCNC: 3.4 G/DL (ref 3.4–5)
ALBUMIN/GLOB SERPL: 1 {RATIO} (ref 1–2)
ALP LIVER SERPL-CCNC: 70 U/L
ALT SERPL-CCNC: 20 U/L
ANION GAP SERPL CALC-SCNC: 4 MMOL/L (ref 0–18)
AST SERPL-CCNC: 14 U/L (ref 15–37)
B-HCG SERPL-ACNC: <1 MIU/ML
BASOPHILS # BLD AUTO: 0.03 X10(3) UL (ref 0–0.2)
BASOPHILS NFR BLD AUTO: 0.3 %
BILIRUB SERPL-MCNC: 0.6 MG/DL (ref 0.1–2)
BUN BLD-MCNC: 11 MG/DL (ref 7–18)
CALCIUM BLD-MCNC: 8.9 MG/DL (ref 8.5–10.1)
CHLORIDE SERPL-SCNC: 109 MMOL/L (ref 98–112)
CO2 SERPL-SCNC: 26 MMOL/L (ref 21–32)
CREAT BLD-MCNC: 0.89 MG/DL
D-DIMER: 0.57 UG/ML FEU (ref ?–0.5)
EOSINOPHIL # BLD AUTO: 0.13 X10(3) UL (ref 0–0.7)
EOSINOPHIL NFR BLD AUTO: 1.4 %
ERYTHROCYTE [DISTWIDTH] IN BLOOD BY AUTOMATED COUNT: 12.8 %
GLOBULIN PLAS-MCNC: 3.5 G/DL (ref 2.8–4.4)
GLUCOSE BLD-MCNC: 81 MG/DL (ref 70–99)
HCT VFR BLD AUTO: 40.2 %
HGB BLD-MCNC: 13.5 G/DL
IMM GRANULOCYTES # BLD AUTO: 0.02 X10(3) UL (ref 0–1)
IMM GRANULOCYTES NFR BLD: 0.2 %
LYMPHOCYTES # BLD AUTO: 2.67 X10(3) UL (ref 1–4)
LYMPHOCYTES NFR BLD AUTO: 28 %
MCH RBC QN AUTO: 28.1 PG (ref 26–34)
MCHC RBC AUTO-ENTMCNC: 33.6 G/DL (ref 31–37)
MCV RBC AUTO: 83.6 FL
MONOCYTES # BLD AUTO: 0.68 X10(3) UL (ref 0.1–1)
MONOCYTES NFR BLD AUTO: 7.1 %
NEUTROPHILS # BLD AUTO: 6.01 X10 (3) UL (ref 1.5–7.7)
NEUTROPHILS # BLD AUTO: 6.01 X10(3) UL (ref 1.5–7.7)
NEUTROPHILS NFR BLD AUTO: 63 %
OSMOLALITY SERPL CALC.SUM OF ELEC: 286 MOSM/KG (ref 275–295)
PLATELET # BLD AUTO: 326 10(3)UL (ref 150–450)
POTASSIUM SERPL-SCNC: 3.3 MMOL/L (ref 3.5–5.1)
PROT SERPL-MCNC: 6.9 G/DL (ref 6.4–8.2)
RBC # BLD AUTO: 4.81 X10(6)UL
SARS-COV-2 RNA RESP QL NAA+PROBE: NOT DETECTED
SODIUM SERPL-SCNC: 139 MMOL/L (ref 136–145)
TROPONIN I SERPL-MCNC: <0.045 NG/ML (ref ?–0.04)
WBC # BLD AUTO: 9.5 X10(3) UL (ref 4–11)

## 2021-09-19 PROCEDURE — 96374 THER/PROPH/DIAG INJ IV PUSH: CPT

## 2021-09-19 PROCEDURE — 85379 FIBRIN DEGRADATION QUANT: CPT | Performed by: EMERGENCY MEDICINE

## 2021-09-19 PROCEDURE — 85025 COMPLETE CBC W/AUTO DIFF WBC: CPT | Performed by: EMERGENCY MEDICINE

## 2021-09-19 PROCEDURE — 99285 EMERGENCY DEPT VISIT HI MDM: CPT

## 2021-09-19 PROCEDURE — 84702 CHORIONIC GONADOTROPIN TEST: CPT | Performed by: EMERGENCY MEDICINE

## 2021-09-19 PROCEDURE — 93005 ELECTROCARDIOGRAM TRACING: CPT

## 2021-09-19 PROCEDURE — 84484 ASSAY OF TROPONIN QUANT: CPT | Performed by: EMERGENCY MEDICINE

## 2021-09-19 PROCEDURE — 94640 AIRWAY INHALATION TREATMENT: CPT

## 2021-09-19 PROCEDURE — 96361 HYDRATE IV INFUSION ADD-ON: CPT

## 2021-09-19 PROCEDURE — 71275 CT ANGIOGRAPHY CHEST: CPT | Performed by: EMERGENCY MEDICINE

## 2021-09-19 PROCEDURE — 80053 COMPREHEN METABOLIC PANEL: CPT | Performed by: EMERGENCY MEDICINE

## 2021-09-19 PROCEDURE — 71046 X-RAY EXAM CHEST 2 VIEWS: CPT | Performed by: EMERGENCY MEDICINE

## 2021-09-19 PROCEDURE — 93010 ELECTROCARDIOGRAM REPORT: CPT

## 2021-09-19 RX ORDER — POTASSIUM CHLORIDE 20 MEQ/1
20 TABLET, EXTENDED RELEASE ORAL ONCE
Status: COMPLETED | OUTPATIENT
Start: 2021-09-19 | End: 2021-09-19

## 2021-09-19 RX ORDER — METHYLPREDNISOLONE SODIUM SUCCINATE 125 MG/2ML
125 INJECTION, POWDER, LYOPHILIZED, FOR SOLUTION INTRAMUSCULAR; INTRAVENOUS ONCE
Status: COMPLETED | OUTPATIENT
Start: 2021-09-19 | End: 2021-09-19

## 2021-09-19 RX ORDER — CLARITHROMYCIN 500 MG/1
500 TABLET, COATED ORAL 2 TIMES DAILY
Qty: 20 TABLET | Refills: 0 | Status: SHIPPED | OUTPATIENT
Start: 2021-09-19 | End: 2021-09-29

## 2021-09-19 RX ORDER — ALBUTEROL SULFATE 90 UG/1
2 AEROSOL, METERED RESPIRATORY (INHALATION) EVERY 4 HOURS PRN
Qty: 1 EACH | Refills: 0 | Status: SHIPPED | OUTPATIENT
Start: 2021-09-19 | End: 2021-10-19

## 2021-09-19 RX ORDER — ALBUTEROL SULFATE 90 UG/1
4 AEROSOL, METERED RESPIRATORY (INHALATION) 4 TIMES DAILY
Status: DISCONTINUED | OUTPATIENT
Start: 2021-09-19 | End: 2021-09-19

## 2021-09-19 RX ORDER — PREDNISONE 20 MG/1
20 TABLET ORAL 2 TIMES DAILY
Qty: 10 TABLET | Refills: 0 | Status: SHIPPED | OUTPATIENT
Start: 2021-09-19 | End: 2021-09-24

## 2021-09-19 NOTE — ED INITIAL ASSESSMENT (HPI)
Pt c/o girish and cough \"all summer\", states worse over the past week. Pt states has had construction in the house.

## 2021-09-19 NOTE — ED NOTES
XR CHEST PA + LAT CHEST (CPT=71046)    Result Date: 9/19/2021  PROCEDURE:  XR CHEST PA + LAT CHEST (CPT=71046)  INDICATIONS:  Difficulty breathing, no hx of asthma and no exposure to covid  COMPARISON:  [facility] , XR, XR CHEST AP PORTABLE  (CPT=71045), 11/03 No mass or axillary adenopathy. LIMITED ABDOMEN:  Limited images of the upper abdomen are unremarkable. BONES:  No bony lesion or fracture. OTHER:  Negative. CONCLUSION:  No evidence of pulmonary embolus.    Dictated by (CST): Zoey Gr

## 2021-09-19 NOTE — ED PROVIDER NOTES
Patient Seen in: [facility] Emergency Department      History   Patient presents with:  Difficulty Breathing    Stated Complaint: Difficulty breathing, no hx of asthma and no exposure to covid    Subjective:   HPI    I reviewed a telephone note from child   • Depression    • IBS    • IBS (irritable bowel syndrome)    • KIDNEY STONE 2002   • Migraine    • Migraines    • PONV (postoperative nausea and vomiting)     nausea   • Raynaud's disease    • Tennis elbow    • Visual impairment     reading glasses appropriately. She is speaking in full clear sentences but coughing frequently.   The cough has a dry bronchospastic quality  Eyes: sclera white, conjunctiva pink and moist.  Lids and lashes are normal.  Neck: With no JVD  Lungs: Clear to auscultation bila infarct              I reviewed pulmonology note from 2019:  Ms. Harris Graham is a 42 yo female with history of depression, IBS, Raynaud's, chronic headaches (persistent headache since January) who presents for evaluation of persistent cough.  In the end of exercise induced asthma.     3. Normal breathing reserve.     4. Normal oximetry without evidence for exercise induced asthma. Cough: suspect some persistence is related to post nasal drip.   Lack of improvement with prednisone or albuterol speaks against thus far. CT imaging is pending. If this is negative, will discharge home with close follow-up.   She has already made an appoint with her pulmonologist.  I will treat with Biaxin antibiotic and advised patient not to use her Ubrevyl while on this medicat

## 2021-09-20 LAB
ATRIAL RATE: 59 BPM
P AXIS: 29 DEGREES
P-R INTERVAL: 152 MS
Q-T INTERVAL: 414 MS
QRS DURATION: 72 MS
QTC CALCULATION (BEZET): 409 MS
R AXIS: 23 DEGREES
T AXIS: 7 DEGREES
VENTRICULAR RATE: 59 BPM

## 2022-11-01 ENCOUNTER — APPOINTMENT (OUTPATIENT)
Dept: GENERAL RADIOLOGY | Facility: HOSPITAL | Age: 47
End: 2022-11-01
Attending: EMERGENCY MEDICINE
Payer: COMMERCIAL

## 2022-11-01 ENCOUNTER — HOSPITAL ENCOUNTER (EMERGENCY)
Facility: HOSPITAL | Age: 47
Discharge: HOME | End: 2022-11-01
Attending: EMERGENCY MEDICINE
Payer: COMMERCIAL

## 2022-11-01 VITALS
BODY MASS INDEX: 25.69 KG/M2 | SYSTOLIC BLOOD PRESSURE: 104 MMHG | WEIGHT: 145 LBS | HEART RATE: 72 BPM | TEMPERATURE: 98 F | DIASTOLIC BLOOD PRESSURE: 74 MMHG | RESPIRATION RATE: 16 BRPM | HEIGHT: 63 IN | OXYGEN SATURATION: 99 %

## 2022-11-01 DIAGNOSIS — R00.2 PALPITATIONS: Primary | ICD-10-CM

## 2022-11-01 LAB
ALBUMIN SERPL-MCNC: 3.8 G/DL (ref 3.4–5)
ALBUMIN/GLOB SERPL: 1.1 {RATIO} (ref 1–2)
ALP LIVER SERPL-CCNC: 73 U/L
ALT SERPL-CCNC: 25 U/L
ANION GAP SERPL CALC-SCNC: 5 MMOL/L (ref 0–18)
AST SERPL-CCNC: 17 U/L (ref 15–37)
ATRIAL RATE: 79 BPM
BASOPHILS # BLD AUTO: 0.03 X10(3) UL (ref 0–0.2)
BASOPHILS NFR BLD AUTO: 0.3 %
BILIRUB SERPL-MCNC: 0.6 MG/DL (ref 0.1–2)
BUN BLD-MCNC: 11 MG/DL (ref 7–18)
CALCIUM BLD-MCNC: 9.6 MG/DL (ref 8.5–10.1)
CHLORIDE SERPL-SCNC: 107 MMOL/L (ref 98–112)
CO2 SERPL-SCNC: 27 MMOL/L (ref 21–32)
CREAT BLD-MCNC: 0.83 MG/DL
D DIMER PPP FEU-MCNC: <0.27 UG/ML FEU (ref ?–0.5)
EOSINOPHIL # BLD AUTO: 0.12 X10(3) UL (ref 0–0.7)
EOSINOPHIL NFR BLD AUTO: 1.1 %
ERYTHROCYTE [DISTWIDTH] IN BLOOD BY AUTOMATED COUNT: 12.7 %
GFR SERPLBLD BASED ON 1.73 SQ M-ARVRAT: 89 ML/MIN/1.73M2 (ref 60–?)
GLOBULIN PLAS-MCNC: 3.6 G/DL (ref 2.8–4.4)
GLUCOSE BLD-MCNC: 87 MG/DL (ref 70–99)
HCT VFR BLD AUTO: 43.5 %
HGB BLD-MCNC: 14.3 G/DL
IMM GRANULOCYTES # BLD AUTO: 0.03 X10(3) UL (ref 0–1)
IMM GRANULOCYTES NFR BLD: 0.3 %
LYMPHOCYTES # BLD AUTO: 3.03 X10(3) UL (ref 1–4)
LYMPHOCYTES NFR BLD AUTO: 29 %
MCH RBC QN AUTO: 28.6 PG (ref 26–34)
MCHC RBC AUTO-ENTMCNC: 32.9 G/DL (ref 31–37)
MCV RBC AUTO: 87 FL
MONOCYTES # BLD AUTO: 0.66 X10(3) UL (ref 0.1–1)
MONOCYTES NFR BLD AUTO: 6.3 %
NEUTROPHILS # BLD AUTO: 6.57 X10 (3) UL (ref 1.5–7.7)
NEUTROPHILS # BLD AUTO: 6.57 X10(3) UL (ref 1.5–7.7)
NEUTROPHILS NFR BLD AUTO: 63 %
OSMOLALITY SERPL CALC.SUM OF ELEC: 287 MOSM/KG (ref 275–295)
P AXIS: 35 DEGREES
P-R INTERVAL: 154 MS
PLATELET # BLD AUTO: 332 10(3)UL (ref 150–450)
POTASSIUM SERPL-SCNC: 3.5 MMOL/L (ref 3.5–5.1)
PROT SERPL-MCNC: 7.4 G/DL (ref 6.4–8.2)
Q-T INTERVAL: 372 MS
QRS DURATION: 70 MS
QTC CALCULATION (BEZET): 426 MS
R AXIS: 26 DEGREES
RBC # BLD AUTO: 5 X10(6)UL
SODIUM SERPL-SCNC: 139 MMOL/L (ref 136–145)
T AXIS: 5 DEGREES
TROPONIN I HIGH SENSITIVITY: 4 NG/L
VENTRICULAR RATE: 79 BPM
WBC # BLD AUTO: 10.4 X10(3) UL (ref 4–11)

## 2022-11-01 PROCEDURE — 93005 ELECTROCARDIOGRAM TRACING: CPT

## 2022-11-01 PROCEDURE — 85025 COMPLETE CBC W/AUTO DIFF WBC: CPT | Performed by: EMERGENCY MEDICINE

## 2022-11-01 PROCEDURE — 99284 EMERGENCY DEPT VISIT MOD MDM: CPT

## 2022-11-01 PROCEDURE — 85379 FIBRIN DEGRADATION QUANT: CPT | Performed by: EMERGENCY MEDICINE

## 2022-11-01 PROCEDURE — 93010 ELECTROCARDIOGRAM REPORT: CPT

## 2022-11-01 PROCEDURE — 71045 X-RAY EXAM CHEST 1 VIEW: CPT | Performed by: EMERGENCY MEDICINE

## 2022-11-01 PROCEDURE — 84484 ASSAY OF TROPONIN QUANT: CPT | Performed by: EMERGENCY MEDICINE

## 2022-11-01 PROCEDURE — 80053 COMPREHEN METABOLIC PANEL: CPT | Performed by: EMERGENCY MEDICINE

## 2022-11-01 PROCEDURE — 36415 COLL VENOUS BLD VENIPUNCTURE: CPT

## 2022-11-01 NOTE — ED INITIAL ASSESSMENT (HPI)
Pt states her heart has been irregular-fast with any activity. Pt just turned in her 48 hour holter yesterday. Pt states she is currently having symptoms but when she sits her heart rate comes back down.

## 2024-07-02 ENCOUNTER — OFFICE VISIT (OUTPATIENT)
Dept: SURGERY | Facility: CLINIC | Age: 49
End: 2024-07-02

## 2024-07-02 DIAGNOSIS — R31.29 MICROHEMATURIA: ICD-10-CM

## 2024-07-02 DIAGNOSIS — R33.9 INCOMPLETE BLADDER EMPTYING: ICD-10-CM

## 2024-07-02 DIAGNOSIS — R82.90 URINE FINDING: ICD-10-CM

## 2024-07-02 DIAGNOSIS — R10.9 FLANK PAIN: Primary | ICD-10-CM

## 2024-07-02 DIAGNOSIS — N20.0 KIDNEY STONE: ICD-10-CM

## 2024-07-02 LAB
APPEARANCE: CLEAR
BILIRUBIN: NEGATIVE
GLUCOSE (URINE DIPSTICK): NEGATIVE MG/DL
KETONES (URINE DIPSTICK): NEGATIVE MG/DL
LEUKOCYTES: NEGATIVE
MULTISTIX LOT#: ABNORMAL NUMERIC
NITRITE, URINE: NEGATIVE
PH, URINE: 6 (ref 4.5–8)
PROTEIN (URINE DIPSTICK): NEGATIVE MG/DL
SPECIFIC GRAVITY: 1.01 (ref 1–1.03)
URINE-COLOR: YELLOW
UROBILINOGEN,SEMI-QN: 0.2 MG/DL (ref 0–1.9)

## 2024-07-02 PROCEDURE — 51798 US URINE CAPACITY MEASURE: CPT

## 2024-07-02 PROCEDURE — 81003 URINALYSIS AUTO W/O SCOPE: CPT

## 2024-07-02 PROCEDURE — 99204 OFFICE O/P NEW MOD 45 MIN: CPT

## 2024-07-02 RX ORDER — AMITRIPTYLINE HYDROCHLORIDE 50 MG/1
50 TABLET, FILM COATED ORAL NIGHTLY
COMMUNITY
Start: 2024-05-02

## 2024-07-02 RX ORDER — METOPROLOL SUCCINATE 25 MG/1
25 TABLET, EXTENDED RELEASE ORAL
COMMUNITY
Start: 2023-07-17

## 2024-07-02 RX ORDER — PIMECROLIMUS 10 MG/G
CREAM TOPICAL
COMMUNITY
Start: 2024-02-08

## 2024-07-02 NOTE — PROGRESS NOTES
[facility], [facility]    Urology Consult Note    History of Present Illness:   Patient is a(n) 48 year old female with hx of depression, IBS, migraines, raynaud's, and kidney stones s/p left ESWL with Dr. Roberson , left URS with Dr. Barrientos , , right URS with Dr. Herrmann  who presents for left flank pain.    Pt reports ongoing left flank pain for the past 2-3mos which feels like passing a kidney stone. She saw Dr. Barrientos on 24 for this. CT A/P completed showed stable multiple punctate nonobstructive left sided stones compared to 2019. No right sided stones. Was told to take NSAIDs and this has not improved sx.     She did have UTI a couple wks ago with culture that grew e. Coli. At the time, had dysuria, frequency, urgency, which resolved with abx but flank pain did not change with UTI resolving. She gets UTIs very rarely.     UA today trace intact blood, still on period. Drinks 48oz water and 2-3 sodas a day. BM regular. PVR 123mL prior to double voiding. After double voiding, PVR 25mL. She mentions needing to lean to completely void.      HISTORY:  Past Medical History:    Calculus of kidney    CHICKEN POX    as a child    Depression    IBS    IBS (irritable bowel syndrome)    KIDNEY STONE    Migraine    Migraines    PONV (postoperative nausea and vomiting)    nausea    Raynaud's disease    Tennis elbow    Visual impairment    reading glasses      Past Surgical History:   Procedure Laterality Date    Colonoscopy  ~    Normal per pt    Elbow surgery Right     Fragmenting of kidney stone  5/17/10    Performed by SAIMA ROBERSON at [facility]    Lithotripsy Left 2016    [facility] - [place]    Lithotripsy Left 2018    L ESWL with cysto, [facility]    Lithotripsy Left 2010    L ESWL, dr roberson      x2    Other surgical history  2018    L extracorporeal shockwave lithotripsy, cysto, L retrograde pyelogram-Dr. Roberson    Removal of kidney  stone  2002    kidney stone removed    Sigmoidoscopy,diagnostic  4/9/12    wnl (to hepatic flexure)    [place] teeth removed  2001    wisdom teeth removal x 4      Family History   Problem Relation Age of Onset    Lipids Father     Hypertension Father     Hypertension Mother     Lipids Mother     Diabetes Maternal Grandfather     Cancer Maternal Grandfather         not sure what kind or any details    Osteoporosis Paternal Grandmother     Other (Other) Paternal Grandmother         2 hip replacements    Other (Other) Sister         Juvenile rheum arthritis    Other (hypertrophic cardiomyopathy) Paternal Cousin Female       Social History:   Social History     Socioeconomic History    Marital status:     Number of children: 2   Occupational History    Occupation: homemaker   Tobacco Use    Smoking status: Never    Smokeless tobacco: Never   Vaping Use    Vaping status: Never Used   Substance and Sexual Activity    Alcohol use: Yes     Alcohol/week: 0.0 standard drinks of alcohol     Comment: seldom    Drug use: No    Sexual activity: Yes     Partners: Male   Other Topics Concern    Caffeine Concern Yes     Comment: one soda daily    Exercise No     Comment: not currently        Allergies  Allergies   Allergen Reactions    Tramadol ITCHING    Cefuroxime NAUSEA AND VOMITING       Review of Systems:   A 10-point review of systems was completed and is negative other than as noted above.    Physical Exam:   There were no vitals taken for this visit.    GENERAL APPEARANCE: no acute distress  NEUROLOGIC: converses appropriately  HEAD: atraumatic, normocephalic  LUNGS: non-labored breathing  ABDOMEN: soft, nontender, non-distended  BACK: no CVA tenderness  PSYCH: appropriate affect and mood    Results:     Laboratory Data:  Lab Results   Component Value Date    WBC 10.4 11/01/2022    HGB 14.3 11/01/2022    .0 11/01/2022     Lab Results   Component Value Date     11/01/2022    K 3.5 11/01/2022      11/01/2022    CO2 27.0 11/01/2022    BUN 11 11/01/2022    GLU 87 11/01/2022    GFRAA 92 09/19/2021    AST 17 11/01/2022    ALT 25 11/01/2022    TP 7.4 11/01/2022    ALB 3.8 11/01/2022    CA 9.6 11/01/2022       Urinalysis Results (last 3 years):  Recent Labs     07/02/24  1341   SPECGRAVITY 1.010   PHURINE 6.0   NITRITE Negative       Urine Culture Results (last 3 years):  Lab Results   Component Value Date    URINECUL No Growth at 18-24 hrs. 04/18/2020    URINECUL No Growth at 18-24 hrs. 10/31/2019    URINECUL  09/19/2019     <10,000 cfu/ml Mixture of Gram positive organisms isolated - probable contamination.    URINECUL 50,000-99,000 CFU/ML Klebsiella pneumoniae (A) 07/18/2018    URINECUL (A) 06/21/2018     <10,000 CFU/ML Streptococcus agalactiae (Group B beta strep)    URINECUL <10,000 CFU/ML Gram negative erick 06/21/2018    URINECUL >100,000 CFU/ML Streptococcus agalactiae (A) 07/18/2016    URINECUL <10,000 CFU/ML Gram positive erick 07/18/2016       Imaging  No results found.      Impression:   Recommendations:  Flank pain  - conservative mgmt given unlikely to be stone  - KBUS to r/o hydro; possible stricture given multiple URS in the past    UTI  - discussed dietery modifications such as d-mannose, cranberry/extract pills/supplements, drinking at least 40-60 oz water per day or enough to keep urine clear and to avoid dietary irritants such as coffee, tea, carbonated drinks, soda, juice, spicy, and citrus  - discussed behavioral modifications such as double voiding, bending over after void to completely empty, and post coital hygiene  - avoid constipation  - repeat microscopic in 1wk to r/o microhematuria    Incomplete bladder emptying  - discussed PFT to help with emptying and referral placed as this can potentially help reduce UTI risk    Thank you very much for this consult. Please call if there are any questions or concerns.     Jacquelyn Bourne PA-C  Urology  [facility]  Phone:  [phone]    Date: 7/2/2024  Time: 2:19 PM

## 2024-07-09 ENCOUNTER — LAB ENCOUNTER (OUTPATIENT)
Dept: LAB | Age: 49
End: 2024-07-09
Payer: COMMERCIAL

## 2024-07-09 DIAGNOSIS — R31.29 MICROHEMATURIA: ICD-10-CM

## 2024-07-09 PROCEDURE — 81015 MICROSCOPIC EXAM OF URINE: CPT

## 2024-07-18 ENCOUNTER — HOSPITAL ENCOUNTER (OUTPATIENT)
Dept: ULTRASOUND IMAGING | Age: 49
Discharge: HOME | End: 2024-07-18
Payer: COMMERCIAL

## 2024-07-18 DIAGNOSIS — N20.0 KIDNEY STONE: ICD-10-CM

## 2024-07-18 PROCEDURE — 76770 US EXAM ABDO BACK WALL COMP: CPT

## 2024-08-21 ENCOUNTER — OFFICE VISIT (OUTPATIENT)
Dept: SURGERY | Facility: CLINIC | Age: 49
End: 2024-08-21

## 2024-08-21 DIAGNOSIS — R31.29 MICROHEMATURIA: ICD-10-CM

## 2024-08-21 DIAGNOSIS — N20.0 KIDNEY STONE: Primary | ICD-10-CM

## 2024-08-21 DIAGNOSIS — R82.90 URINE FINDING: ICD-10-CM

## 2024-08-21 LAB
APPEARANCE: CLEAR
BILIRUBIN: NEGATIVE
GLUCOSE (URINE DIPSTICK): NEGATIVE MG/DL
KETONES (URINE DIPSTICK): NEGATIVE MG/DL
MULTISTIX LOT#: ABNORMAL NUMERIC
NITRITE, URINE: NEGATIVE
PH, URINE: 5.5 (ref 4.5–8)
PROTEIN (URINE DIPSTICK): NEGATIVE MG/DL
SPECIFIC GRAVITY: 1.02 (ref 1–1.03)
URINE-COLOR: YELLOW
UROBILINOGEN,SEMI-QN: 0.2 MG/DL (ref 0–1.9)

## 2024-08-21 PROCEDURE — 99213 OFFICE O/P EST LOW 20 MIN: CPT

## 2024-08-21 PROCEDURE — 81003 URINALYSIS AUTO W/O SCOPE: CPT

## 2024-08-21 RX ORDER — ALBUTEROL SULFATE 90 UG/1
2 AEROSOL, METERED RESPIRATORY (INHALATION) EVERY 4 HOURS PRN
COMMUNITY

## 2024-08-21 RX ORDER — METOCLOPRAMIDE 10 MG/1
1 TABLET ORAL 4 TIMES DAILY
COMMUNITY

## 2024-08-21 NOTE — PROGRESS NOTES
HPI:   Patient is a(n) 48 year old female with hx of depression, IBS, migraines, raynaud's, and kidney stones s/p left ESWL with Dr. Roberson , left URS with Dr. Barrientos , , right URS with Dr. Herrmann  who presents for follow up.    I last saw pt on 24 for ongoing left flank pain and left groin pain for the prior 2-3mos. CT A/P 24 showed multiple punctate nonobstructing left renal stones, no hydro, no right sided stones. We did repeat KBUS to r/o hydro in the event she was passing stone since CT imaging but this was neg. UA with trace intact blood but further microscopic testing showed neg RBC.     Sx unchanged since LOV. No a/a factors. Felt minor UTI coming on but resolved spontaneously with increasing fluid intake. NSAIDs do not help with flank pain. Has not tried heat packs. Today UA with small blood and small leuks.     HISTORY:  Past Medical History:    Calculus of kidney    CHICKEN POX    as a child    Depression    IBS    IBS (irritable bowel syndrome)    KIDNEY STONE    Migraine    Migraines    PONV (postoperative nausea and vomiting)    nausea    Raynaud's disease    Tennis elbow    Visual impairment    reading glasses      Past Surgical History:   Procedure Laterality Date    Colonoscopy  ~    Normal per pt    Elbow surgery Right     Fragmenting of kidney stone  5/17/10    Performed by SAIMA ROBERSON at [facility], [facility]    Lithotripsy Left 2016    [facility] - [place]    Lithotripsy Left 2018    L ESWL with cysto, [facility]    Lithotripsy Left 2010    L ESWL, dr roberson      x2    Other surgical history  2018    L extracorporeal shockwave lithotripsy, cysto, L retrograde pyelogram-Dr. Roberson    Removal of kidney stone      kidney stone removed    Sigmoidoscopy,diagnostic  12    wnl (to hepatic flexure)    [place] teeth removed      wisdom teeth removal x 4      Family History   Problem Relation Age of Onset    Lipids Father     Hypertension Father      Hypertension Mother     Lipids Mother     Diabetes Maternal Grandfather     Cancer Maternal Grandfather         not sure what kind or any details    Osteoporosis Paternal Grandmother     Other (Other) Paternal Grandmother         2 hip replacements    Other (Other) Sister         Juvenile rheum arthritis    Other (hypertrophic cardiomyopathy) Paternal Cousin Female       Social History:   Social History     Socioeconomic History    Marital status:     Number of children: 2   Occupational History    Occupation: homemaker   Tobacco Use    Smoking status: Never    Smokeless tobacco: Never   Vaping Use    Vaping status: Never Used   Substance and Sexual Activity    Alcohol use: Yes     Alcohol/week: 0.0 standard drinks of alcohol     Comment: seldom    Drug use: No    Sexual activity: Yes     Partners: Male   Other Topics Concern    Caffeine Concern Yes     Comment: one soda daily    Exercise No     Comment: not currently        Medications (Active prior to today's visit):  Current Outpatient Medications   Medication Sig Dispense Refill    albuterol 108 (90 Base) MCG/ACT Inhalation Aero Soln Inhale 2 puffs into the lungs every 4 (four) hours as needed.      metoclopramide 10 MG Oral Tab Take 1 tablet (10 mg total) by mouth 4 (four) times daily.      amitriptyline 50 MG Oral Tab Take 1 tablet (50 mg total) by mouth nightly.      metoprolol succinate ER 25 MG Oral Tablet 24 Hr Take 1 tablet (25 mg total) by mouth. 1 tab every am and 1/2 tab every pm      metoprolol tartrate 25 MG Oral Tab Metoprolol Oral (Tartrate)     1 tab in AM, half a tab in the PM    active      pimecrolimus 1 % External Cream APPLY TO AFFECTED AREAS OF FACE TWICE DAILY FOR 2 WEEKS      OnabotulinumtoxinA (BOTOX IJ) Inject as directed. Botox injections severy 3 months Neurologist      NON FORMULARY Vyepti Infusion every 3 months      NON FORMULARY Vitamin D 3  2,000 units daily      fluticasone furoate-vilanterol (BREO ELLIPTA) 200-25 MCG/INH  Inhalation Aerosol Powder, Breath Activated Inhale 1 puff into the lungs daily. (Patient not taking: Reported on 7/2/2024) 1 each 5    erythromycin 5 MG/GM Ophthalmic Ointment Place 1 Application into the right eye every 6 (six) hours. (Patient not taking: Reported on 7/2/2024) 5 g 1    ketoconazole 2 % External Cream Apply to AA BID 30 g 2    desonide 0.05 % External Cream Apply to AA on face BID x 2 weeks, then PRN 30 g 2    Desonide 0.05 % External Cream Apply to AA of face daily x 7 days, then hold and restart PRN 15 g 0    UBRELVY 100 MG Oral Tab TAKE 1 TABLET BY MOUTH TWICE DAILY AS NEEDED FOR MIGRAINE. MAY REPEAT 2 HOURS AFTER INITIAL DOSE      Probiotic Product (PROBIOTIC-10 OR) Take 1 capsule by mouth daily. (Patient not taking: Reported on 7/2/2024)      clonazePAM 0.5 MG Oral Tablet Dispersible Take 1 tablet (0.5 mg total) by mouth daily as needed (for anxiety). (Patient not taking: Reported on 7/2/2024) 30 tablet 0    Ketorolac Tromethamine 15.75 MG/SPRAY Nasal Solution 15.75 mg by Nasal route.   (Patient not taking: Reported on 7/2/2024)      baclofen 10 MG Oral Tab Take 10 mg by mouth.   (Patient not taking: Reported on 7/2/2024)         Allergies:  Allergies   Allergen Reactions    Tramadol ITCHING    Cefuroxime NAUSEA AND VOMITING       ROS:     A comprehensive 10 point review of systems was completed.  Pertinent positives and negatives noted in the the HPI.    PHYSICAL EXAM:     GENERAL APPEARANCE: well, developed, well nourished, in no acute distress  EARS: hearing intact  LUNGS: nonlabored breathing  ABDOMEN: soft, no obvious masses or tenderness, no CVA tenderness     ASSESSMENT/PLAN:   Kidney stone  - 24h urine and bloodwork placed for prevention  - defer to PCP regarding ongoing flank pain; unlikely  source given persistent normal imaging    Microhematuria  - microscopic analysis today  - if pos, discussed need for CTU and cysto    The above assessment and plan were discussed in detail with  the patient who verbalized understanding and all questions were answered.    Jacquelyn Bourne PA-C  Urology  [facility]  Office: [phone]

## 2024-09-16 PROCEDURE — 83945 ASSAY OF OXALATE: CPT

## 2024-09-16 PROCEDURE — 82436 ASSAY OF URINE CHLORIDE: CPT

## 2024-09-16 PROCEDURE — 82507 ASSAY OF CITRATE: CPT

## 2024-09-16 PROCEDURE — 81050 URINALYSIS VOLUME MEASURE: CPT

## 2024-09-16 PROCEDURE — 83986 ASSAY PH BODY FLUID NOS: CPT

## 2024-09-16 PROCEDURE — 84105 ASSAY OF URINE PHOSPHORUS: CPT

## 2024-09-16 PROCEDURE — 84392 ASSAY OF URINE SULFATE: CPT

## 2024-09-16 PROCEDURE — 84133 ASSAY OF URINE POTASSIUM: CPT

## 2024-09-16 PROCEDURE — 84560 ASSAY OF URINE/URIC ACID: CPT

## 2024-09-16 PROCEDURE — 83735 ASSAY OF MAGNESIUM: CPT

## 2024-09-16 PROCEDURE — 84300 ASSAY OF URINE SODIUM: CPT

## 2024-09-16 PROCEDURE — 82340 ASSAY OF CALCIUM IN URINE: CPT

## 2024-09-17 ENCOUNTER — LAB ENCOUNTER (OUTPATIENT)
Dept: LAB | Age: 49
End: 2024-09-17
Payer: COMMERCIAL

## 2024-09-17 DIAGNOSIS — N20.0 KIDNEY STONE: ICD-10-CM

## 2024-09-24 LAB
AMMONIA, URINE: 31 MMOL/24 HR
CHLORIDE URINE: 91 MMOL/24 HR
CITRIC ACID(CITRATE): 706 MG/24 HR
CREATININE, URINE: 1161 MG/24 HR
CREATININE/KG BODY WEIGHT, URINE: 17.1 MG/24 HR/KG
CREATININE/KG BODY WEIGHT, URINE: 17.1 MG/24 HR/KG
LC CALCIUM OXALATE SATURATION, URINE: 6.59
LC CALCIUM PHOSPHATE SATURATION, URINE: 0.34
LC CALCIUM, URINE: 268 MG/24 HR
LC CALCIUM/CREATININE RATIO, URINE: 231 MG/G CREAT
LC CALCIUM/KG BODY WEIGHT, URINE: 3.9 MG/24 HR/KG
MAGNESIUM, URINE: 73 MG/24 HR
OXALATES, URINE: 28 MG/24 HR
PH, 24 HR URINE: 5.5
PHOSPHORUS, URINE: 778 MG/24 HR
POTASSIUM, URINE: 25 MMOL/24 HR
PROTEIN CATABOLIC RATE, URINE: 0.6 G/KG/24 HR
PROTEIN CATABOLIC RATE, URINE: 0.6 G/KG/24 HR
SODIUM, URINE: 86 MMOL/24 HR
SULFATE, URINE: 21 MEQ/24 HR
UREA NITROGEN, URINE: 4.54 G/24 HR
UREA NITROGEN, URINE: 4.54 G/24 HR
URIC ACID SATURATION, URINE: 0.76
URIC ACID SATURATION, URINE: 0.76
URIC ACID, URINE: 378 MG/24 HR
URINE VOLUME (PRESERVATIVE): 2360 ML/24 HR

## 2024-10-07 ENCOUNTER — OFFICE VISIT (OUTPATIENT)
Dept: SURGERY | Facility: CLINIC | Age: 49
End: 2024-10-07

## 2024-10-07 DIAGNOSIS — N20.0 KIDNEY STONE: Primary | ICD-10-CM

## 2024-10-07 PROCEDURE — 99213 OFFICE O/P EST LOW 20 MIN: CPT

## 2024-10-07 RX ORDER — POTASSIUM CITRATE 15 MEQ/1
1 TABLET, EXTENDED RELEASE ORAL 2 TIMES DAILY
Qty: 180 TABLET | Refills: 3 | Status: SHIPPED | OUTPATIENT
Start: 2024-10-07

## 2024-10-07 NOTE — PROGRESS NOTES
HPI:   Alexandra Navarro is a(n) 48 year old female patient with a hx of depression, IBS, migraines, raynaud's and kidney stones who presents for follow up.     Most recently completed 24h urine with following results.    24 Hour Metabolic Urine Study Results    Volume: 2360 mL/day*  Goal >2500  Calcium: 268 mg/day*  Goal <250  Oxalate: 28 mg/day  Goal <40  Citrate: 706 mg/day  Goal >450  Sodium: 86 mmol/day   Goal <150  pH: 5.5*  Goal 5.8 - 6.2 (>6 for UA stones, >7 for cystine stone)  Uric acid: 378 mg/day  Goal <750      HISTORY:  Past Medical History:    Calculus of kidney    CHICKEN POX    as a child    Depression    IBS    IBS (irritable bowel syndrome)    KIDNEY STONE    Migraine    Migraines    PONV (postoperative nausea and vomiting)    nausea    Raynaud's disease    Tennis elbow    Visual impairment    reading glasses      Past Surgical History:   Procedure Laterality Date    Colonoscopy  ~    Normal per pt    Elbow surgery Right     Fragmenting of kidney stone  5/17/10    Performed by SAIMA ROBERSON [place] [facility] [place]    Lithotripsy Left 2016    [place]    Lithotripsy Left 2018    L ESWL with cysto, [facility]    Lithotripsy Left 2010    L ESWL, dr roberson      x2    Other surgical history  2018    L extracorporeal shockwave lithotripsy, cysto, L retrograde pyelogram-Dr. Roberson    Removal of kidney stone      kidney stone removed    Sigmoidoscopy,diagnostic  12    wnl (to hepatic flexure)    [place] teeth removed      wisdom teeth removal x 4      Family History   Problem Relation Age of Onset    Lipids Father     Hypertension Father     Hypertension Mother     Lipids Mother     Diabetes Maternal Grandfather     Cancer Maternal Grandfather         not sure what kind or any details    Osteoporosis Paternal Grandmother     Other (Other) Paternal Grandmother         2 hip replacements    Other (Other) Sister         Juvenile rheum arthritis    Other (hypertrophic  cardiomyopathy) Paternal Cousin Female       Social History:   Social History     Socioeconomic History    Marital status:     Number of children: 2   Occupational History    Occupation: homemaker   Tobacco Use    Smoking status: Never    Smokeless tobacco: Never   Vaping Use    Vaping status: Never Used   Substance and Sexual Activity    Alcohol use: Yes     Alcohol/week: 0.0 standard drinks of alcohol     Comment: seldom    Drug use: No    Sexual activity: Yes     Partners: Male   Other Topics Concern    Caffeine Concern Yes     Comment: one soda daily    Exercise No     Comment: not currently        Medications (Active prior to today's visit):  Current Outpatient Medications   Medication Sig Dispense Refill    Potassium Citrate ER 15 MEQ (1620 MG) Oral Tab CR Take 1 tablet by mouth in the morning and 1 tablet before bedtime. 180 tablet 3    albuterol 108 (90 Base) MCG/ACT Inhalation Aero Soln Inhale 2 puffs into the lungs every 4 (four) hours as needed.      metoclopramide 10 MG Oral Tab Take 1 tablet (10 mg total) by mouth 4 (four) times daily.      amitriptyline 50 MG Oral Tab Take 1 tablet (50 mg total) by mouth nightly.      metoprolol succinate ER 25 MG Oral Tablet 24 Hr Take 1 tablet (25 mg total) by mouth. 1 tab every am and 1/2 tab every pm      metoprolol tartrate 25 MG Oral Tab Metoprolol Oral (Tartrate)     1 tab in AM, half a tab in the PM    active      pimecrolimus 1 % External Cream APPLY TO AFFECTED AREAS OF FACE TWICE DAILY FOR 2 WEEKS      OnabotulinumtoxinA (BOTOX IJ) Inject as directed. Botox injections severy 3 months Neurologist      NON FORMULARY Vyepti Infusion every 3 months      NON FORMULARY Vitamin D 3  2,000 units daily      fluticasone furoate-vilanterol (BREO ELLIPTA) 200-25 MCG/INH Inhalation Aerosol Powder, Breath Activated Inhale 1 puff into the lungs daily. 1 each 5    erythromycin 5 MG/GM Ophthalmic Ointment Place 1 Application into the right eye every 6 (six) hours. 5 g  1    ketoconazole 2 % External Cream Apply to AA BID 30 g 2    desonide 0.05 % External Cream Apply to AA on face BID x 2 weeks, then PRN 30 g 2    Desonide 0.05 % External Cream Apply to AA of face daily x 7 days, then hold and restart PRN 15 g 0    UBRELVY 100 MG Oral Tab TAKE 1 TABLET BY MOUTH TWICE DAILY AS NEEDED FOR MIGRAINE. MAY REPEAT 2 HOURS AFTER INITIAL DOSE      Probiotic Product (PROBIOTIC-10 OR) Take 1 capsule by mouth daily.      clonazePAM 0.5 MG Oral Tablet Dispersible Take 1 tablet (0.5 mg total) by mouth daily as needed (for anxiety). 30 tablet 0    Ketorolac Tromethamine 15.75 MG/SPRAY Nasal Solution 15.75 mg by Nasal route.      baclofen 10 MG Oral Tab Take 1 tablet (10 mg total) by mouth.         Allergies:  Allergies   Allergen Reactions    Tramadol ITCHING    Cefuroxime NAUSEA AND VOMITING       ROS:     A comprehensive 10 point review of systems was completed.  Pertinent positives and negatives noted in the the HPI.    PHYSICAL EXAM:     GENERAL APPEARANCE: well, developed, well nourished, in no acute distress  EARS: hearing intact  LUNGS: nonlabored breathing  ABDOMEN: soft, no obvious masses or tenderness, no CVA tenderness     ASSESSMENT/PLAN:   Kidney stone  -Increase water intake (approximately 40-60 ounces per day) for goal urine output greater than 2500 mL/day  -Dietary calcium intake should be approximately 3670-7553 mg/day  -For urinary alkalinization, recommend potassium citrate 15 mEq twice daily  -Repeat 24-hour urine study in 6 weeks to assess effect of above interventions and this has been sent    The above assessment and plan were discussed in detail with the patient who verbalized understanding and all questions were answered.      Jacquelyn Bourne PA-C  Urology  [facility]  Office: [phone]

## 2025-01-14 PROCEDURE — 83735 ASSAY OF MAGNESIUM: CPT

## 2025-01-14 PROCEDURE — 82340 ASSAY OF CALCIUM IN URINE: CPT

## 2025-01-14 PROCEDURE — 82507 ASSAY OF CITRATE: CPT

## 2025-01-14 PROCEDURE — 82436 ASSAY OF URINE CHLORIDE: CPT

## 2025-01-14 PROCEDURE — 84105 ASSAY OF URINE PHOSPHORUS: CPT

## 2025-01-14 PROCEDURE — 84133 ASSAY OF URINE POTASSIUM: CPT

## 2025-01-14 PROCEDURE — 81050 URINALYSIS VOLUME MEASURE: CPT

## 2025-01-14 PROCEDURE — 84392 ASSAY OF URINE SULFATE: CPT

## 2025-01-14 PROCEDURE — 84560 ASSAY OF URINE/URIC ACID: CPT

## 2025-01-14 PROCEDURE — 83945 ASSAY OF OXALATE: CPT

## 2025-01-14 PROCEDURE — 84300 ASSAY OF URINE SODIUM: CPT

## 2025-01-14 PROCEDURE — 83986 ASSAY PH BODY FLUID NOS: CPT

## 2025-01-15 ENCOUNTER — LAB ENCOUNTER (OUTPATIENT)
Dept: LAB | Age: 50
End: 2025-01-15
Payer: COMMERCIAL

## 2025-01-15 DIAGNOSIS — N20.0 KIDNEY STONE: ICD-10-CM

## 2025-01-22 LAB
AMMONIA, URINE: 18 MMOL/24 HR
CHLORIDE URINE: 139 MMOL/24 HR
CITRIC ACID(CITRATE): 1218 MG/24 HR
CREATININE, URINE: 1248 MG/24 HR
CREATININE/KG BODY WEIGHT, URINE: 16.4 MG/24 HR/KG
CREATININE/KG BODY WEIGHT, URINE: 16.4 MG/24 HR/KG
LC CALCIUM OXALATE SATURATION, URINE: 4.85
LC CALCIUM PHOSPHATE SATURATION, URINE: 0.55
LC CALCIUM, URINE: 143 MG/24 HR
LC CALCIUM/CREATININE RATIO, URINE: 115 MG/G CREAT
LC CALCIUM/KG BODY WEIGHT, URINE: 1.9 MG/24 HR/KG
MAGNESIUM, URINE: 54 MG/24 HR
OXALATES, URINE: 37 MG/24 HR
PH, 24 HR URINE: 6.35
PHOSPHORUS, URINE: 560 MG/24 HR
POTASSIUM, URINE: 49 MMOL/24 HR
PROTEIN CATABOLIC RATE, URINE: 0.6 G/KG/24 HR
PROTEIN CATABOLIC RATE, URINE: 0.6 G/KG/24 HR
SODIUM, URINE: 142 MMOL/24 HR
SULFATE, URINE: 17 MEQ/24 HR
UREA NITROGEN, URINE: 4.85 G/24 HR
UREA NITROGEN, URINE: 4.85 G/24 HR
URIC ACID SATURATION, URINE: 0.21
URIC ACID SATURATION, URINE: 0.21
URIC ACID, URINE: 491 MG/24 HR
URINE VOLUME (PRESERVATIVE): 2450 ML/24 HR

## 2025-01-23 DIAGNOSIS — N20.0 NEPHROLITHIASIS: Primary | ICD-10-CM

## 2025-07-29 ENCOUNTER — HOSPITAL ENCOUNTER (OUTPATIENT)
Dept: ULTRASOUND IMAGING | Facility: HOSPITAL | Age: 50
Discharge: HOME | End: 2025-07-29
Attending: PHYSICIAN ASSISTANT

## 2025-07-29 DIAGNOSIS — N20.0 NEPHROLITHIASIS: ICD-10-CM

## 2025-07-29 PROCEDURE — 76770 US EXAM ABDO BACK WALL COMP: CPT | Performed by: PHYSICIAN ASSISTANT

## 2025-08-04 ENCOUNTER — OFFICE VISIT (OUTPATIENT)
Dept: SURGERY | Facility: CLINIC | Age: 50
End: 2025-08-04

## 2025-08-04 DIAGNOSIS — R10.9 FLANK PAIN: Primary | ICD-10-CM

## 2025-08-04 DIAGNOSIS — R82.90 URINE FINDING: ICD-10-CM

## 2025-08-04 DIAGNOSIS — R82.994 HYPERCALCIURIA: ICD-10-CM

## 2025-08-04 DIAGNOSIS — R82.991 HYPOCITRATURIA: ICD-10-CM

## 2025-08-04 DIAGNOSIS — N20.0 NEPHROLITHIASIS: ICD-10-CM

## 2025-08-04 LAB
APPEARANCE: CLEAR
BILIRUBIN: NEGATIVE
GLUCOSE (URINE DIPSTICK): NEGATIVE MG/DL
KETONES (URINE DIPSTICK): NEGATIVE MG/DL
LEUKOCYTES: NEGATIVE
MULTISTIX LOT#: ABNORMAL NUMERIC
NITRITE, URINE: NEGATIVE
PH, URINE: 6 (ref 4.5–8)
PROTEIN (URINE DIPSTICK): NEGATIVE MG/DL
SPECIFIC GRAVITY: <=1.005 (ref 1–1.03)
URINE-COLOR: YELLOW
UROBILINOGEN,SEMI-QN: 0.2 MG/DL (ref 0–1.9)

## 2025-08-04 PROCEDURE — 99214 OFFICE O/P EST MOD 30 MIN: CPT | Performed by: PHYSICIAN ASSISTANT

## 2025-08-04 PROCEDURE — 81003 URINALYSIS AUTO W/O SCOPE: CPT | Performed by: PHYSICIAN ASSISTANT

## 2025-08-05 RX ORDER — POTASSIUM CITRATE 15 MEQ/1
1 TABLET, EXTENDED RELEASE ORAL 2 TIMES DAILY
Qty: 180 TABLET | Refills: 3 | Status: SHIPPED | OUTPATIENT
Start: 2025-08-05

## 2025-08-11 ENCOUNTER — PATIENT MESSAGE (OUTPATIENT)
Dept: CASE MANAGEMENT | Age: 50
End: 2025-08-11

## 2025-08-12 ENCOUNTER — HOSPITAL ENCOUNTER (OUTPATIENT)
Dept: CT IMAGING | Age: 50
Discharge: HOME | End: 2025-08-12
Attending: PHYSICIAN ASSISTANT

## 2025-08-12 DIAGNOSIS — N20.0 NEPHROLITHIASIS: ICD-10-CM

## 2025-08-12 DIAGNOSIS — R10.9 FLANK PAIN: ICD-10-CM

## 2025-08-12 PROCEDURE — 74176 CT ABD & PELVIS W/O CONTRAST: CPT | Performed by: PHYSICIAN ASSISTANT

## (undated) DEVICE — STERILE POLYISOPRENE POWDER-FREE SURGICAL GLOVES: Brand: PROTEXIS

## (undated) DEVICE — SOL  .9 3000ML

## (undated) DEVICE — Device

## (undated) DEVICE — KENDALL SCD EXPRESS SLEEVES, KNEE LENGTH, MEDIUM: Brand: KENDALL SCD

## (undated) DEVICE — 5F CONE TIP CATHETER 8F TIP

## (undated) DEVICE — PROXIS URETERAL ACCESS SHEATH

## (undated) DEVICE — NITINOL WIRE STR 035

## (undated) DEVICE — CYSTO CDS-LF: Brand: MEDLINE INDUSTRIES, INC.

## (undated) DEVICE — FLEXIVA  PULSE  AND  FLEXIVA  PULSE  TRACTIP  LASER  FIBERS  ARE  HIGH  POWER  SINGLE-USE FIBER: Brand: FLEXIVA PULSE ID

## (undated) DEVICE — ZIPWIRE GUIDEWIRE .035X150 STR

## (undated) DEVICE — NITINOL STONE RETRIEVAL BASKET: Brand: ZERO TIP

## (undated) NOTE — LETTER
Tosin Wing [address] 6 [address]  [place], [address] [place] [address]    Consent for Operation  Date: __________________                                Time: _______________    1.  I authorize the performance upon Ismael Camarena the following operation:  Procedure(s revealed by the pictures or by descriptive texts accompanying them. If the procedure has been videotaped, the surgeon will obtain the original videotape. The hospital will not be responsible for storage or maintenance of this tape.   7. For the purpose of a THAT MY DOCTOR PROVIDED ME WITH THE ABOVE EXPLANATIONS, THAT ALL BLANKS OR STATEMENTS REQUIRING INSERTION OR COMPLETION WERE FILLED IN.     Signature of Patient:   ___________________________    When the patient is a minor or mentally incompetent to give co iii. All of the medicines I take (including prescriptions, herbal supplements, and pills I can buy without a prescription (including street drugs/illegal medications).  Failure to inform my anesthesiologist about these medicines may increase my risk of anes _____________________________________________________________________________  Anesthesiologist Signature     Date   Time  I have discussed the procedure and information above with the patient (or patient’s representative) and answered their questions.  The

## (undated) NOTE — ED AVS SNAPSHOT
Nate Benites   MRN: [id number]    Department:  [facility] Emergency Department   Date of Visit:  10/31/2019           Disclosure     Insurance plans vary and the physician(s) referred by the ER may not be covered by your plan.  Please contact your tell this physician (or your personal doctor if your instructions are to return to your personal doctor) about any new or lasting problems. The primary care or specialist physician will see patients referred from the [facility] Emergency Department.  Thang Sue

## (undated) NOTE — ED AVS SNAPSHOT
Nate Benites   MRN: [id number]    Department:  [facility] Emergency Department   Date of Visit:  9/28/2019           Disclosure     Insurance plans vary and the physician(s) referred by the ER may not be covered by your plan.  Please contact your tell this physician (or your personal doctor if your instructions are to return to your personal doctor) about any new or lasting problems. The primary care or specialist physician will see patients referred from the [facility] Emergency Department.  Thang Sue

## (undated) NOTE — ED AVS SNAPSHOT
Lizzie Loss   MRN: [id number]    Department:  [facility] Emergency Department   Date of Visit:  1/29/2019           Disclosure     Insurance plans vary and the physician(s) referred by the ER may not be covered by your plan.  Please contact your tell this physician (or your personal doctor if your instructions are to return to your personal doctor) about any new or lasting problems. The primary care or specialist physician will see patients referred from the [facility] Emergency Department.  Daniella Kenney

## (undated) NOTE — ED AVS SNAPSHOT
Bety Neal   MRN: [id number]    Department:  [facility] Emergency Department   Date of Visit:  6/23/2019           Disclosure     Insurance plans vary and the physician(s) referred by the ER may not be covered by your plan.  Please contact your tell this physician (or your personal doctor if your instructions are to return to your personal doctor) about any new or lasting problems. The primary care or specialist physician will see patients referred from the [facility] Emergency Department.  Anil Urban